# Patient Record
Sex: FEMALE | Race: WHITE | Employment: FULL TIME | URBAN - METROPOLITAN AREA
[De-identification: names, ages, dates, MRNs, and addresses within clinical notes are randomized per-mention and may not be internally consistent; named-entity substitution may affect disease eponyms.]

---

## 2018-01-15 ENCOUNTER — OFFICE VISIT - HEALTHEAST (OUTPATIENT)
Dept: FAMILY MEDICINE | Facility: CLINIC | Age: 48
End: 2018-01-15

## 2018-01-15 DIAGNOSIS — Z00.00 ROUTINE GENERAL MEDICAL EXAMINATION AT A HEALTH CARE FACILITY: ICD-10-CM

## 2018-01-15 DIAGNOSIS — Z12.31 VISIT FOR SCREENING MAMMOGRAM: ICD-10-CM

## 2018-01-15 DIAGNOSIS — L40.9 PSORIASIS: ICD-10-CM

## 2018-01-15 DIAGNOSIS — J45.20 MILD INTERMITTENT ASTHMA WITHOUT COMPLICATION: ICD-10-CM

## 2018-01-15 LAB
ERYTHROCYTE [DISTWIDTH] IN BLOOD BY AUTOMATED COUNT: 11.2 % (ref 11–14.5)
HCT VFR BLD AUTO: 40.2 % (ref 35–47)
HGB BLD-MCNC: 13.3 G/DL (ref 12–16)
MCH RBC QN AUTO: 32 PG (ref 27–34)
MCHC RBC AUTO-ENTMCNC: 33 G/DL (ref 32–36)
MCV RBC AUTO: 97 FL (ref 80–100)
PLATELET # BLD AUTO: 154 THOU/UL (ref 140–440)
PMV BLD AUTO: 9.3 FL (ref 7–10)
RBC # BLD AUTO: 4.14 MILL/UL (ref 3.8–5.4)
TSH SERPL DL<=0.005 MIU/L-ACNC: 3.56 UIU/ML (ref 0.3–5)
WBC: 7 THOU/UL (ref 4–11)

## 2018-01-15 ASSESSMENT — MIFFLIN-ST. JEOR: SCORE: 1161.58

## 2018-01-24 ENCOUNTER — RECORDS - HEALTHEAST (OUTPATIENT)
Dept: ADMINISTRATIVE | Facility: OTHER | Age: 48
End: 2018-01-24

## 2018-02-05 ENCOUNTER — RECORDS - HEALTHEAST (OUTPATIENT)
Dept: MAMMOGRAPHY | Facility: CLINIC | Age: 48
End: 2018-02-05

## 2018-02-05 DIAGNOSIS — Z12.31 ENCOUNTER FOR SCREENING MAMMOGRAM FOR MALIGNANT NEOPLASM OF BREAST: ICD-10-CM

## 2018-03-16 ENCOUNTER — OFFICE VISIT - HEALTHEAST (OUTPATIENT)
Dept: FAMILY MEDICINE | Facility: CLINIC | Age: 48
End: 2018-03-16

## 2018-03-16 DIAGNOSIS — R23.2 HOT FLASHES: ICD-10-CM

## 2018-03-16 DIAGNOSIS — J02.9 SORE THROAT: ICD-10-CM

## 2018-03-16 LAB — DEPRECATED S PYO AG THROAT QL EIA: NORMAL

## 2018-03-16 ASSESSMENT — MIFFLIN-ST. JEOR: SCORE: 1146.15

## 2018-03-17 LAB — GROUP A STREP BY PCR: NORMAL

## 2018-04-06 ENCOUNTER — COMMUNICATION - HEALTHEAST (OUTPATIENT)
Dept: HEALTH INFORMATION MANAGEMENT | Facility: CLINIC | Age: 48
End: 2018-04-06

## 2018-04-06 ENCOUNTER — COMMUNICATION - HEALTHEAST (OUTPATIENT)
Dept: TELEHEALTH | Facility: CLINIC | Age: 48
End: 2018-04-06

## 2019-01-28 ENCOUNTER — OFFICE VISIT - HEALTHEAST (OUTPATIENT)
Dept: FAMILY MEDICINE | Facility: CLINIC | Age: 49
End: 2019-01-28

## 2019-01-28 DIAGNOSIS — Z13.220 LIPID SCREENING: ICD-10-CM

## 2019-01-28 DIAGNOSIS — J45.20 MILD INTERMITTENT ASTHMA WITHOUT COMPLICATION: ICD-10-CM

## 2019-01-28 DIAGNOSIS — L29.9 ITCHY SKIN: ICD-10-CM

## 2019-01-28 DIAGNOSIS — R23.2 HOT FLASHES: ICD-10-CM

## 2019-01-28 DIAGNOSIS — L40.9 PSORIASIS: ICD-10-CM

## 2019-01-28 DIAGNOSIS — Z00.00 ROUTINE GENERAL MEDICAL EXAMINATION AT A HEALTH CARE FACILITY: ICD-10-CM

## 2019-01-28 LAB
ALBUMIN SERPL-MCNC: 4 G/DL (ref 3.5–5)
ALP SERPL-CCNC: 54 U/L (ref 45–120)
ALT SERPL W P-5'-P-CCNC: 22 U/L (ref 0–45)
ANION GAP SERPL CALCULATED.3IONS-SCNC: 10 MMOL/L (ref 5–18)
AST SERPL W P-5'-P-CCNC: 25 U/L (ref 0–40)
BASOPHILS # BLD AUTO: 0 THOU/UL (ref 0–0.2)
BASOPHILS NFR BLD AUTO: 1 % (ref 0–2)
BILIRUB SERPL-MCNC: 0.7 MG/DL (ref 0–1)
BUN SERPL-MCNC: 10 MG/DL (ref 8–22)
CALCIUM SERPL-MCNC: 9.4 MG/DL (ref 8.5–10.5)
CHLORIDE BLD-SCNC: 105 MMOL/L (ref 98–107)
CHOLEST SERPL-MCNC: 201 MG/DL
CO2 SERPL-SCNC: 26 MMOL/L (ref 22–31)
CREAT SERPL-MCNC: 0.85 MG/DL (ref 0.6–1.1)
EOSINOPHIL # BLD AUTO: 0.2 THOU/UL (ref 0–0.4)
EOSINOPHIL NFR BLD AUTO: 5 % (ref 0–6)
ERYTHROCYTE [DISTWIDTH] IN BLOOD BY AUTOMATED COUNT: 10.8 % (ref 11–14.5)
FASTING STATUS PATIENT QL REPORTED: NO
GFR SERPL CREATININE-BSD FRML MDRD: >60 ML/MIN/1.73M2
GLUCOSE BLD-MCNC: 60 MG/DL (ref 70–125)
HCT VFR BLD AUTO: 43.8 % (ref 35–47)
HDLC SERPL-MCNC: 83 MG/DL
HGB BLD-MCNC: 14.5 G/DL (ref 12–16)
LDLC SERPL CALC-MCNC: 107 MG/DL
LYMPHOCYTES # BLD AUTO: 0.9 THOU/UL (ref 0.8–4.4)
LYMPHOCYTES NFR BLD AUTO: 18 % (ref 20–40)
MCH RBC QN AUTO: 31.8 PG (ref 27–34)
MCHC RBC AUTO-ENTMCNC: 33.1 G/DL (ref 32–36)
MCV RBC AUTO: 96 FL (ref 80–100)
MONOCYTES # BLD AUTO: 0.7 THOU/UL (ref 0–0.9)
MONOCYTES NFR BLD AUTO: 14 % (ref 2–10)
NEUTROPHILS # BLD AUTO: 3.1 THOU/UL (ref 2–7.7)
NEUTROPHILS NFR BLD AUTO: 63 % (ref 50–70)
PLATELET # BLD AUTO: 131 THOU/UL (ref 140–440)
PMV BLD AUTO: 9.9 FL (ref 7–10)
POTASSIUM BLD-SCNC: 4.7 MMOL/L (ref 3.5–5)
PROT SERPL-MCNC: 7.3 G/DL (ref 6–8)
RBC # BLD AUTO: 4.57 MILL/UL (ref 3.8–5.4)
SODIUM SERPL-SCNC: 141 MMOL/L (ref 136–145)
TRIGL SERPL-MCNC: 56 MG/DL
TSH SERPL DL<=0.005 MIU/L-ACNC: 4.1 UIU/ML (ref 0.3–5)
WBC: 5 THOU/UL (ref 4–11)

## 2019-01-28 ASSESSMENT — MIFFLIN-ST. JEOR: SCORE: 1172.47

## 2019-03-26 ENCOUNTER — RECORDS - HEALTHEAST (OUTPATIENT)
Dept: MAMMOGRAPHY | Facility: CLINIC | Age: 49
End: 2019-03-26

## 2019-03-26 DIAGNOSIS — Z12.31 ENCOUNTER FOR SCREENING MAMMOGRAM FOR MALIGNANT NEOPLASM OF BREAST: ICD-10-CM

## 2020-04-15 ENCOUNTER — VIRTUAL VISIT (OUTPATIENT)
Dept: PHYSICAL THERAPY | Facility: CLINIC | Age: 50
End: 2020-04-15
Payer: COMMERCIAL

## 2020-04-15 DIAGNOSIS — M79.671 RIGHT FOOT PAIN: Primary | ICD-10-CM

## 2020-04-15 PROCEDURE — 97110 THERAPEUTIC EXERCISES: CPT | Mod: TEL | Performed by: PHYSICAL THERAPIST

## 2020-04-15 PROCEDURE — 97161 PT EVAL LOW COMPLEX 20 MIN: CPT | Mod: TEL | Performed by: PHYSICAL THERAPIST

## 2020-04-16 PROBLEM — M79.671 RIGHT FOOT PAIN: Status: ACTIVE | Noted: 2020-04-16

## 2020-04-16 NOTE — PROGRESS NOTES
"Physical Therapy Virtual Initial Visit      The patient has been notified of following:     \"This virtual visit will be conducted between you and your provider. We have found that certain health care needs can be provided without the need for physical presence.  This service lets us provide the care you need with a virtual visit.\"    Due to external, as well as internal [facility] management of the COVID-19 Virus, Jennifer Barnes was not seen in our clinic.  As a substitution, we implemented a virtual visit to manage this patient's condition utilizing the PTRx virtual visit platform via the patient s existing code.  The provider, Candice Fang, reviewed the patient's chart, PTRx prescription, and spoke with the patient to determine the following telemedicine visit is appropriate and effective for the patient's care.    The following type of visit was completed:   Telephone Visit:  A telephone visit was used in conjunction with the online PTRx exercise platform.         PTRx Content from today's visit:  Exercise Name: Great Toe Extension with Patient Generated OP, Sets: 1 - Reps: 15 hold for 5 sec - Sessions: before and after walking each day  Exercise Name: Standing Soleus Stretch, Sets: 1 - Reps: 15 hold for 5 sec off edge of step - Sessions: before and after walking each day          Virtual visit contact time    Time of service began: 4:00 PM  Time of service ended: 4:40 PM  Total Time for set up, visit, and documentation: 70 minutes    Payor: [employer] / Plan: [employer] OPEN ACCESS / Product Type: HMO /     Procedure Code/s   Therapeutic Exercise (38528): 12 minutes  Evaluation:  28 minutes    I have reviewed the note as documented above.  This accurately captures the substance of my conversation with the patient.  Provider location: [facility] ([facility]/State)  Patient location: [facility] Initial Evaluation -- Lower Extremity    Evaluation Date: April 16, " 2020  Jennifer Barnes is a 49 year old female with a R gr toe/ forefoot condition.   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 MD order 3/2/2020.   Symptoms (improving/unchaning/worsening):  Unchanged .      Symptoms commenced as a result of: walking more and bunion getting bigger   Condition occurred in the following environment: community     Symptoms at onset: heel, fasciae  Paresthesia (yes/no):  no  Spinal history:    Cough/Sneeze (pos/neg):      Constant symptoms:   Intermittent symptoms: yes    Symptoms are worse with the following: Always Walking and Time of day - No effect   Symptoms are better with the following: Other - nothing    Continued use makes the pain (better/worse/no effect): unchanged    Disturbed night (yes/no): no      Pain at rest (yes/no):    Site (back/hip/knee/ankle/foot):      Other questions (swelling/clicking/locking/giving way/falling):  none     Previous episodes: none  Previous treatments:     Specific Questions:  General health (excellent/good/fair/poor):  excellent  Pertinent medical history includes: Asthma  Medications (nil/NSAIDS/analg/steroids/anticoag/other):  None  Medical allergies:  pollen  Imaging (none/Xray/MRI/other):  none  Recent or major surgery (yes/no):  na  Night pain (yes/no):  none  Accidents (yes/no):  no  Unexplained weight loss (yes/no):  na  Barriers at home: none  Other red flags: none    Sites for physical examination (back/hip/knee/ankle/foot/other): foot/  ankle    EXAMINATION    Posture:  Sitting (good/fair/poor):     Correction of Posture (better/worse/no effect/NA):   Standing (good/fair/poor):   Other observations:      Neurological: (NA/motor/sensory/reflexes/dural):     Baselines (pain or functional activity):     Extremities (Hip / Knee / Ankle / Foot): ankle    Movement Loss Ko Mod Min Nil Pain   Flexion        Extension        Abduction        Adduction        Internal Rotation        External Rotation        Dorsiflexion    +    Plantarflexion   +     Inversion    + Pull in arch   Eversion    +      Passive Movement (+/- over pressure)/(PDM/ERP):  G mod loss S min loss gr toe extension mod loss   Resisted Test Response (pain): na  Other Tests: gait: pain occurs with PO from toes    Spine:  Movement loss:   Effect of repeated movements:   Effect of static positioning:   Spine testing (not relevant/relevant/secondary problem):     Baseline Symptoms: 0/10  Repeated Tests Symptom Response Mechanical Response   Active/Passive movement, resisted test, functional test During -  Produce, Abolish, Increase, Decrease, NE After -  Better, Worse, NB, NW, NE Effect -   ? or ? ROM, strength or key functional test No   Effect   Rep ankle DF (gastroc) in standing No Effect    No Effect        Seated rep gr toe extension with self OP  No Effect  Inc ROM  Better  walking                        Effect of static positioning                  Provisional Classification (Extremity/Spine):  Extremity - Derangement  Gr toe    Princicple of Management:   Education:  Gr toe extension loss seems to be the problem    Equipment provided:    Exercise and dosage:  Stand G stretch and seated gr toe extension 1/15 hold for 5 sec before and after walking, daily    ASSESSMENT/PLAN:    Patient is a 49 year old female with right side ankle complaints.    Patient has the following significant findings with corresponding treatment plan.                Diagnosis 1:  Gr toe derangement  Pain -   self management, education, directional preference exercise and home program  Decreased ROM/flexibility - therapeutic exercise, therapeutic activity and home program  Decreased joint mobility - therapeutic exercise, therapeutic activity and home program  Decreased function - therapeutic activities and home program      Previous and current functional limitations:  (See Goal Flow Sheet for this information)    Short term and Long term goals: (See Goal Flow Sheet for this information)     Communication ability:  Patient appears to be able to clearly communicate and understand verbal and written communication and follow directions correctly.  Treatment Explanation - The following has been discussed with the patient:   RX ordered/plan of care  Anticipated outcomes  Possible risks and side effects  This patient would benefit from PT intervention to resume normal activities.   Rehab potential is good.    Frequency:  2 X a month, once daily  Duration:  for 2 months  Discharge Plan:  Achieve all LTG.  Independent in home treatment program.  Reach maximal therapeutic benefit.    Please refer to the daily flowsheet for treatment today, total treatment time and time spent performing 1:1 timed codes.       ___________________________________________________

## 2020-04-29 ENCOUNTER — VIRTUAL VISIT (OUTPATIENT)
Dept: PHYSICAL THERAPY | Facility: CLINIC | Age: 50
End: 2020-04-29
Payer: COMMERCIAL

## 2020-04-29 DIAGNOSIS — M79.671 RIGHT FOOT PAIN: Primary | ICD-10-CM

## 2020-04-29 PROCEDURE — 97530 THERAPEUTIC ACTIVITIES: CPT | Mod: GT | Performed by: PHYSICAL THERAPIST

## 2020-04-29 PROCEDURE — 97110 THERAPEUTIC EXERCISES: CPT | Mod: GT | Performed by: PHYSICAL THERAPIST

## 2020-04-29 NOTE — PROGRESS NOTES
"DISCHARGE REPORT    Physical Therapy Virtual Follow Up Visit      Progress reporting period is from [id number] to [id number]    The patient has been notified of following:     \"This virtual visit will be conducted between you and your provider. We have found that certain health care needs can be provided without the need for physical presence.  This service lets us provide the care you need with a virtual visit.\"    Due to external, as well as internal [facility] management of the COVID-19 Virus, Jennifer Barnes was not seen in our clinic.  As a substitution, we implemented a virtual visit to manage this patient's condition utilizing the PTRx virtual visit platform via the patient s existing code.  The provider, Candice Fang, reviewed the patient's chart, PTRx prescription, and spoke with the patient to determine the following telemedicine visit is appropriate and effective for the patient's care.    The following type of visit was completed:   Video Visit:  The [employer] platform uses a synchronous HIPAA compliant video stream for this patient encounter.      SUBJECTIVE  Subjective changes noted by patient:  Jennifer Barnes is a 49 year old female. Connected virtually on the PTRx platform to discuss their condition/progress. They noted improvements in walking and now able to run 2 miles pain free. Her goal is to run 3 miles 2 x week..        Current pain level is 0/10  .     Previous pain level was  4/10  .   Changes in function:  Yes (See Goal flowsheet attached for changes in current functional level)  Adverse reaction to treatment or activity: None    OBJECTIVE  Changes noted in objective findings:  Yes, AROM: R ankle wnl  Gr toe flex and extension are wnl passively. She gets a moderate gastroc stretch off the edge of step.    PTRx Content from today's visit:  Exercise Name: Toe Raises, Sets: 2 - Reps: 10-12 to start 30 is the goal then do on 1 foot  - Sessions: 3-4 x week  Exercise Name: Ankle Diagonal " Posterior Tibialis, Sets: 2 - Reps: 10-12 slowly - Sessions: 3-4 x week  Exercise Name: Standing Soleus Stretch, Sets: 1 - Reps: 15 hold for 5 sec off edge of step - Sessions: before and after walking each day    ASSESSMENT/PLAN  Updated problem list and treatment plan: Diagnosis 1:  Foot pain  Decreased ROM/flexibility - home program  Decreased strength - home program  STG/LTGs have been met or progress has been made towards goals:  Yes (See Goal flow sheet completed today.)  Assessment of Progress: The patient has met all of their long term goals.  Self Management Plans:  Patient is independent in a home treatment program.  Patient is independent in self management of symptoms.    Jennifer continues to require the following intervention to meet STG and LTG's:  PT intervention is no longer required to meet STG/LTG.    Recommendations:  Patient will continue with the exercise program assigned on their PTRx code.  This patient is ready to be discharged from therapy and continue their home treatment program.      Virtual visit contact time    Time of service began: 1:07PM  Time of service ended: 1:30 PM  Total Time for set up, visit, and documentation: 33 minutes    Payor: [employer] / Plan: [employer] OPEN ACCESS / Product Type: HMO /         I have reviewed the note as documented above.  This accurately captures the substance of my conversation with the patient.  Provider location: [facility] ([facility]/State)  Patient location: home

## 2021-01-04 ENCOUNTER — HEALTH MAINTENANCE LETTER (OUTPATIENT)
Age: 51
End: 2021-01-04

## 2021-02-22 ENCOUNTER — OFFICE VISIT - HEALTHEAST (OUTPATIENT)
Dept: FAMILY MEDICINE | Facility: CLINIC | Age: 51
End: 2021-02-22

## 2021-02-22 DIAGNOSIS — Z00.00 ROUTINE GENERAL MEDICAL EXAMINATION AT A HEALTH CARE FACILITY: ICD-10-CM

## 2021-02-22 DIAGNOSIS — R59.0 SUPRACLAVICULAR LYMPHADENOPATHY: ICD-10-CM

## 2021-02-22 DIAGNOSIS — Z12.31 VISIT FOR SCREENING MAMMOGRAM: ICD-10-CM

## 2021-02-22 DIAGNOSIS — L40.9 PSORIASIS: ICD-10-CM

## 2021-02-22 DIAGNOSIS — Z12.4 SCREENING FOR MALIGNANT NEOPLASM OF CERVIX: ICD-10-CM

## 2021-02-22 DIAGNOSIS — Z13.820 SCREENING FOR OSTEOPOROSIS: ICD-10-CM

## 2021-02-22 DIAGNOSIS — J45.20 MILD INTERMITTENT ASTHMA WITHOUT COMPLICATION: ICD-10-CM

## 2021-02-22 LAB
ALBUMIN SERPL-MCNC: 4.2 G/DL (ref 3.5–5)
ALP SERPL-CCNC: 76 U/L (ref 45–120)
ALT SERPL W P-5'-P-CCNC: 30 U/L (ref 0–45)
ANION GAP SERPL CALCULATED.3IONS-SCNC: 10 MMOL/L (ref 5–18)
AST SERPL W P-5'-P-CCNC: 27 U/L (ref 0–40)
BASOPHILS # BLD AUTO: 0 THOU/UL (ref 0–0.2)
BASOPHILS NFR BLD AUTO: 0 % (ref 0–2)
BILIRUB SERPL-MCNC: 0.7 MG/DL (ref 0–1)
BUN SERPL-MCNC: 14 MG/DL (ref 8–22)
C REACTIVE PROTEIN LHE: 0.2 MG/DL (ref 0–0.8)
CALCIUM SERPL-MCNC: 9 MG/DL (ref 8.5–10.5)
CHLORIDE BLD-SCNC: 105 MMOL/L (ref 98–107)
CO2 SERPL-SCNC: 24 MMOL/L (ref 22–31)
CREAT SERPL-MCNC: 0.85 MG/DL (ref 0.6–1.1)
EOSINOPHIL # BLD AUTO: 0.1 THOU/UL (ref 0–0.4)
EOSINOPHIL NFR BLD AUTO: 2 % (ref 0–6)
ERYTHROCYTE [DISTWIDTH] IN BLOOD BY AUTOMATED COUNT: 11.8 % (ref 11–14.5)
ERYTHROCYTE [SEDIMENTATION RATE] IN BLOOD BY WESTERGREN METHOD: 7 MM/HR (ref 0–20)
GFR SERPL CREATININE-BSD FRML MDRD: >60 ML/MIN/1.73M2
GLUCOSE BLD-MCNC: 69 MG/DL (ref 70–125)
HCT VFR BLD AUTO: 38.9 % (ref 35–47)
HGB BLD-MCNC: 12.9 G/DL (ref 12–16)
IMM GRANULOCYTES # BLD: 0 THOU/UL
IMM GRANULOCYTES NFR BLD: 0 %
LYMPHOCYTES # BLD AUTO: 1.8 THOU/UL (ref 0.8–4.4)
LYMPHOCYTES NFR BLD AUTO: 21 % (ref 20–40)
MCH RBC QN AUTO: 31.2 PG (ref 27–34)
MCHC RBC AUTO-ENTMCNC: 33.2 G/DL (ref 32–36)
MCV RBC AUTO: 94 FL (ref 80–100)
MONOCYTES # BLD AUTO: 0.8 THOU/UL (ref 0–0.9)
MONOCYTES NFR BLD AUTO: 10 % (ref 2–10)
NEUTROPHILS # BLD AUTO: 5.5 THOU/UL (ref 2–7.7)
NEUTROPHILS NFR BLD AUTO: 67 % (ref 50–70)
PLATELET # BLD AUTO: 192 THOU/UL (ref 140–440)
PMV BLD AUTO: 11 FL (ref 7–10)
POTASSIUM BLD-SCNC: 4.4 MMOL/L (ref 3.5–5)
PROT SERPL-MCNC: 7.3 G/DL (ref 6–8)
RBC # BLD AUTO: 4.13 MILL/UL (ref 3.8–5.4)
SODIUM SERPL-SCNC: 139 MMOL/L (ref 136–145)
TSH SERPL DL<=0.005 MIU/L-ACNC: 3.05 UIU/ML (ref 0.3–5)
WBC: 8.3 THOU/UL (ref 4–11)

## 2021-02-22 ASSESSMENT — MIFFLIN-ST. JEOR: SCORE: 1180.29

## 2021-02-23 LAB
25(OH)D3 SERPL-MCNC: 55.6 NG/ML (ref 30–80)
25(OH)D3 SERPL-MCNC: 55.6 NG/ML (ref 30–80)
HPV SOURCE: NORMAL
HUMAN PAPILLOMA VIRUS 16 DNA: NEGATIVE
HUMAN PAPILLOMA VIRUS 18 DNA: NEGATIVE
HUMAN PAPILLOMA VIRUS FINAL DIAGNOSIS: NORMAL
HUMAN PAPILLOMA VIRUS OTHER HR: NEGATIVE
SPECIMEN DESCRIPTION: NORMAL

## 2021-02-25 ENCOUNTER — RECORDS - HEALTHEAST (OUTPATIENT)
Dept: MAMMOGRAPHY | Facility: CLINIC | Age: 51
End: 2021-02-25

## 2021-02-25 DIAGNOSIS — Z12.31 ENCOUNTER FOR SCREENING MAMMOGRAM FOR MALIGNANT NEOPLASM OF BREAST: ICD-10-CM

## 2021-03-01 LAB
BKR LAB AP ABNORMAL BLEEDING: NO
BKR LAB AP BIRTH CONTROL/HORMONES: ABNORMAL
BKR LAB AP CERVICAL APPEARANCE: NORMAL
BKR LAB AP GYN ADEQUACY: ABNORMAL
BKR LAB AP GYN INTERPRETATION: ABNORMAL
BKR LAB AP HPV REFLEX: ABNORMAL
BKR LAB AP LMP: ABNORMAL
BKR LAB AP PATIENT STATUS: ABNORMAL
BKR LAB AP PREVIOUS ABNORMAL: ABNORMAL
BKR LAB AP PREVIOUS NORMAL: ABNORMAL
HIGH RISK?: NO
PATH REPORT.COMMENTS IMP SPEC: ABNORMAL
RESULT FLAG (HE HISTORICAL CONVERSION): ABNORMAL

## 2021-03-04 ENCOUNTER — COMMUNICATION - HEALTHEAST (OUTPATIENT)
Dept: FAMILY MEDICINE | Facility: CLINIC | Age: 51
End: 2021-03-04

## 2021-03-07 ENCOUNTER — HEALTH MAINTENANCE LETTER (OUTPATIENT)
Age: 51
End: 2021-03-07

## 2021-05-28 ASSESSMENT — ASTHMA QUESTIONNAIRES: ACT_TOTALSCORE: 23

## 2021-05-31 VITALS — HEIGHT: 63 IN | BODY MASS INDEX: 22.29 KG/M2 | WEIGHT: 125.8 LBS

## 2021-06-01 VITALS — HEIGHT: 63 IN | WEIGHT: 122.4 LBS | BODY MASS INDEX: 21.69 KG/M2

## 2021-06-02 VITALS — BODY MASS INDEX: 22.59 KG/M2 | WEIGHT: 127.5 LBS | HEIGHT: 63 IN

## 2021-06-05 VITALS
WEIGHT: 130.1 LBS | HEART RATE: 66 BPM | SYSTOLIC BLOOD PRESSURE: 104 MMHG | DIASTOLIC BLOOD PRESSURE: 62 MMHG | HEIGHT: 63 IN | RESPIRATION RATE: 18 BRPM | TEMPERATURE: 97.3 F | BODY MASS INDEX: 23.05 KG/M2 | OXYGEN SATURATION: 99 %

## 2021-06-15 NOTE — PROGRESS NOTES
Assessment/Plan:     1. Routine general medical examination at a health care facility  Routine history and physical, updated in EMR.  Patient defers fasting labs for a future visit.  Other labs updated as below.  Pap smear up-to-date, as are immunizations.  Mammogram ordered.  Plan repeat physical in 1 year.  - HM2(CBC w/o Differential)  - Thyroid Adams    2. Visit for screening mammogram  - Mammo Screening Bilateral; Future    3. Psoriasis  Patient does quite well with application of lotion, avoidance of chemical exposures to the skin.    4. Mild intermittent asthma without complication  Patient uses her albuterol inhaler prior to exercise mostly.  She does not desire a controller medication and her asthma control test score is within goal range.      Subjective:      Jennifer Barnes is a 47 y.o. female who presents for an annual exam. The patient is sexually active. The patient participates in regular exercise: yes. The patient reports that there is not domestic violence in her life.  Patient has been feeling well and has no specific questions or concerns today.  She would like a refill of her albuterol inhaler, which she uses prior to exercise.    Healthy Habits:   Regular Exercise: Yes  Sunscreen Use: Yes  Healthy Diet: Yes  Dental Visits Regularly: Yes  Seat Belt: Yes  Sexually active: Yes  Self Breast Exam Monthly:Yes  Lipid Profile: Yes and normal 9/2016  Glucose Screen: No  Prevention of Osteoporosis: Yes, almond milk daily      Immunization History   Administered Date(s) Administered     DT (pediatric) 07/28/1984     Rubella 07/28/1984     Tdap 04/29/2016     Immunization status: up to date and documented.    No exam data present    Gynecologic History  Patient's last menstrual period was 12/30/2017.  Contraception: vasectomy  Last Pap: 11/2016. Results were: normal and HPV neg  Last mammogram: in [facility] 2012? Results were: normal      OB History   No data available       Current Outpatient Prescriptions  "  Medication Sig Dispense Refill     albuterol (PROVENTIL HFA;VENTOLIN HFA) 90 mcg/actuation inhaler Inhale 2 puffs every 6 (six) hours as needed for wheezing.       No current facility-administered medications for this visit.      No past medical history on file.  No past surgical history on file.  Dust [other environmental allergy]  No family history on file.  Social History     Social History     Marital status:      Spouse name: N/A     Number of children: N/A     Years of education: N/A     Occupational History     Not on file.     Social History Main Topics     Smoking status: Former Smoker     Smokeless tobacco: Never Used     Alcohol use Yes     Drug use: No     Sexual activity: Yes     Partners: Male     Birth control/ protection: None     Other Topics Concern     Not on file     Social History Narrative       Review of Systems  General:  Denies problem  Eyes: Denies problem  Ears/Nose/Throat: Denies problem  Cardiovascular: Denies problem  Respiratory:  Denies problem  Gastrointestinal:  Denies problem  Genitourinary: Denies problem  Musculoskeletal:  Denies problem  Skin: Denies problem  Neurologic: Denies problem  Psychiatric: Denies problem  Endocrine: Denies problem  Heme/Lymphatic: Denies problem   Allergic/Immunologic: Denies problem        Objective:         Vitals:    01/15/18 1412   BP: 104/58   Pulse: 64   Resp: 16   Weight: 125 lb 12.8 oz (57.1 kg)   Height: 5' 2.8\" (1.595 m)     Body mass index is 22.43 kg/(m^2).    Physical Exam:  General Appearance: Alert, cooperative, no distress, appears stated age  Head: Normocephalic, without obvious abnormality, atraumatic  Eyes: PERRL, conjunctiva/corneas clear, EOM's intact  Ears: Normal TM's and external ear canals, both ears  Nose: Nares normal, septum midline, mucosa normal, no drainage  Throat: Lips, mucosa, and tongue normal; teeth and gums normal  Neck: Supple, symmetrical, trachea midline, no adenopathy; thyroid: not enlarged, symmetric, " no tenderness/mass/nodules  Back: Symmetric, no curvature, ROM normal, no CVA tenderness  Lungs: Clear to auscultation bilaterally, respirations unlabored  Breasts: No breast masses, tenderness, asymmetry, or nipple discharge  Heart: Regular rate and rhythm, S1 and S2 normal, no murmur, rub, or gallop  Abdomen: Soft, non-tender, bowel sounds active all four quadrants, no masses, no organomegaly  Pelvic:Not examined  Extremities: Extremities normal, atraumatic, no cyanosis or edema  Skin: Skin color, texture, turgor normal, no rashes or lesions  Lymph nodes: Cervical, supraclavicular, and axillary nodes normal  Neurologic: Normal

## 2021-06-15 NOTE — PROGRESS NOTES
Assessment/Plan:     1. Routine general medical examination at a health care facility  Routine history and physical, updated in EMR.  Labs updated as below.  Routine screening Pap smear updated today.  Immunizations are up-to-date.  Patient agrees to schedule mammogram and this was ordered.  Plan next routine physical in 1 year.    2. Supraclavicular lymphadenopathy  Lab work-up as below is normal/negative.  Ultrasound revealed likely reactive lymph nodes x2.  Radiology recommended continued observation as patient had received her Covid vaccine on that side and this is something that they have seen in the past.  Patient will let me know if lymphadenopathy persists past another 4 to 6 weeks.  At that point, could consider fine-needle aspiration for biopsy.  - Comprehensive Metabolic Panel  - HM1 (CBC and Differential)  - US Neck Limited; Future  - US Neck Limited    3. Psoriasis  Inflammatory markers as below are negative.  Patient has no concerning joint pains, she has been watching diligently for psoriatic arthritis signs or symptoms.  - Sedimentation Rate  - C-Reactive Protein(CRP)    4. Mild intermittent asthma without complication  ACT score is within goal range.  Due for AAP, completed today.  Doing well with albuterol PRN.    5. Screening for osteoporosis  Workup as below completed for secondary causes of low bone mass and workup is unremarkable.  Asked patient to check with her insurance if DXA is covered and we can certainly order this early.  She has never been a regular smoker, has no significant family history of osteoporosis, is not on high risk medications.  - Sedimentation Rate  - C-Reactive Protein(CRP)  - Vitamin D, Total (25-Hydroxy)  - Comprehensive Metabolic Panel  - Thyroid Cascade  - HM1 (CBC and Differential)    6. Screening for malignant neoplasm of cervix  Routine screening pap smear updated today.  - Gynecologic Cytology (PAP Smear)  - HPV High Risk DNA Cervical    7. Visit for screening  "mammogram  Routine screening mammogram ordered today.  - Mammo Screening Bilateral; Future      Subjective:      Jennifer Barnes is a 50 y.o. female who presents for an annual exam. The patient is sexually active. The patient participates in regular exercise: yes. The patient reports that there is not domestic violence in her life.     1. Plantar fasciitis right foot.  During imaging for this, physician wondered about her bone density as it apparently appeared low.  2. Has had COVID vaccines x2.  Now has an \"inflammed\" lymph node in her left neck area for about 4 weeks.    Healthy Habits:   Regular Exercise: Yes  Sunscreen Use: Yes  Healthy Diet: Yes  Dental Visits Regularly: Yes  Seat Belt: Yes  Sexually active: Yes  Self Breast Exam Monthly:Yes  Lipid Profile: No  Glucose Screen: No  Prevention of Osteoporosis: Yes      Immunization History   Administered Date(s) Administered     DT (pediatric) 1984     Influenza, inj, historic,unspecified 2018     Rubella 1984     Tdap 2016     Immunization status: up to date and documented.    No exam data present    Gynecologic History  No LMP recorded.  Contraception: none,  had a vasectomy   Last Pap: 16. Results were: normal  Last mammogram: . Results were: normal      OB History    Para Term  AB Living   2 2 2         SAB TAB Ectopic Multiple Live Births                  # Outcome Date GA Lbr [place]/2nd Weight Sex Delivery Anes PTL Lv   2 Term            1 Term                Current Outpatient Medications   Medication Sig Dispense Refill     albuterol (PROVENTIL HFA;VENTOLIN HFA) 90 mcg/actuation inhaler Inhale 2 puffs every 6 (six) hours as needed for wheezing.       No current facility-administered medications for this visit.      No past medical history on file.  Past Surgical History:   Procedure Laterality Date     MOUTH SURGERY      dental-related and wisdom teeth (x4)     Dust [other environmental " allergy]  Family History   Adopted: Yes   Problem Relation Age of Onset     Breast cancer Mother 61        genetic testing neg     Cancer Maternal Grandmother         HCC     Alcohol abuse Maternal Grandmother      Colon cancer Neg Hx      Diabetes Neg Hx      Heart disease Neg Hx      Social History     Socioeconomic History     Marital status:      Spouse name: Not on file     Number of children: Not on file     Years of education: Not on file     Highest education level: Not on file   Occupational History     Not on file   Social Needs     Financial resource strain: Not on file     Food insecurity     Worry: Not on file     Inability: Not on file     Transportation needs     Medical: Not on file     Non-medical: Not on file   Tobacco Use     Smoking status: Former Smoker     Packs/day: 0.10     Years: 10.00     Pack years: 1.00     Quit date: 04/2017     Years since quitting: 3.8     Smokeless tobacco: Never Used   Substance and Sexual Activity     Alcohol use: Yes     Alcohol/week: 4.0 standard drinks     Types: 4 Cans of beer per week     Drug use: No     Sexual activity: Yes     Partners: Male     Birth control/protection: Surgical     Comment:  Vinicius   Lifestyle     Physical activity     Days per week: Not on file     Minutes per session: Not on file     Stress: Not on file   Relationships     Social connections     Talks on phone: Not on file     Gets together: Not on file     Attends [employer] service: Not on file     Active member of club or organization: Not on file     Attends meetings of clubs or organizations: Not on file     Relationship status: Not on file     Intimate partner violence     Fear of current or ex partner: Not on file     Emotionally abused: Not on file     Physically abused: Not on file     Forced sexual activity: Not on file   Other Topics Concern     Not on file   Social History Narrative     Not on file       Review of Systems  General:  Denies problem  Eyes: Denies  "problem  Ears/Nose/Throat: Denies problem  Cardiovascular: Denies problem  Respiratory:  Denies problem  Gastrointestinal:  Denies problem  Genitourinary: Denies problem  Musculoskeletal:  Denies problem  Skin: Denies problem  Neurologic: Denies problem  Psychiatric: Denies problem  Endocrine: Denies problem  Heme/Lymphatic: enlarged lymph nodes left supraclavicular   Allergic/Immunologic: Denies problem        Objective:         Vitals:    02/22/21 1450   BP: 104/62   Pulse: 66   Resp: 18   Temp: 97.3  F (36.3  C)   TempSrc: Temporal   SpO2: 99%   Weight: 130 lb 1.6 oz (59 kg)   Height: 5' 2.75\" (1.594 m)     Body mass index is 23.23 kg/m .    Physical Exam:  General Appearance: Alert, cooperative, no distress, appears stated age  Head: Normocephalic, without obvious abnormality, atraumatic  Eyes: PERRL, conjunctiva/corneas clear, EOM's intact  Ears: Normal TM's and external ear canals, both ears  Nose: Nares normal, septum midline,mucosa normal, no drainage  Throat: Lips, mucosa, and tongue normal; teeth and gums normal  Neck: Supple, symmetrical, trachea midline, no adenopathy; thyroid: not enlarged, symmetric, no tenderness/mass/nodules; no carotid bruit or JVD  Back: Symmetric, no curvature, ROM normal, no CVA tenderness  Lungs: Clear to auscultation bilaterally, respirations unlabored  Breasts: No breast masses, tenderness, asymmetry, or nipple discharge.  Heart: Regular rate and rhythm, S1 and S2 normal, no murmur, rub, or gallop, Abdomen: Soft, non-tender, bowel sounds active all four quadrants, no masses, no organomegaly  Pelvic: Normally developed genitalia with no external lesions or eruptions. Vagina and cervix show no lesions, inflammation, discharge or tenderness. No cystocele, No rectocele. Uterus normal to palpation.  No adnexal mass or tenderness.  Extremities: Extremities normal, atraumatic, no cyanosis or edema  Skin: Skin color, texture, turgor normal, no rashes or lesions  Lymph nodes: Cervical " and axillary nodes normal, enlarged supraclavicular nodes on the left x2  Neurologic: Normal

## 2021-06-16 NOTE — PROGRESS NOTES
"  Assessment/Plan:      1. Sore throat  Discussed with patient that likely this represents a viral pharyngitis.  Discussed the usual course of symptoms, may progress to rhinosinusitis and ultimately a cough prior to resolution.  Discussed plenty of rest, plenty of fluids, other over-the-counter treatments.  Follow-up as needed if not improving as expected.  - Rapid Strep A Screen-Throat  - Group A Strep, RNA Direct Detection, Throat    2. Hot flashes  No concerning symptoms like appetite change, stool change, or weight loss.  Likely this is related to perimenopause.  Discussed over-the-counter preparations like black cohosh to start with.  She can follow-up as needed.  She states this is not particularly bothersome.        Subjective:       Jennifer Barnes is a 47 y.o. female who presents for evaluation of sore throat.  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  She wanted to make sure that she did not have strep throat.  She started feeling ill yesterday, denied any measured fevers at home, but has had a sore throat since yesterday evening, worse this morning.  She denies cough, does have a mild runny nose.  She has not had any known exposure to strep, but again is a teacher.  Second, patient states that for the past month, she has had \"drenching\" night sweats.  She is unsure when her mother went through menopause.  She also describes hot flashes at night.  Her last menstrual periods were 6 weeks apart, prior to that were normal intervals.  She states she has lost 3 pounds by stopping alcohol, has had no stool changes.    The following portions of the patient's history were reviewed and updated as appropriate: allergies, current medications, past family history, past social history and problem list.      Current Outpatient Prescriptions:      albuterol (PROVENTIL HFA;VENTOLIN HFA) 90 mcg/actuation inhaler, Inhale 2 puffs every 6 (six) hours as needed for wheezing., " "Disp: , Rfl:     Review of Systems   Pertinent items are noted in HPI.      Objective:      /56 (Patient Site: Right Arm, Patient Position: Sitting, Cuff Size: Adult Regular)  Pulse 80  Temp 99  F (37.2  C) (Oral)   Resp 20  Ht 5' 2.8\" (1.595 m)  Wt 122 lb 6.4 oz (55.5 kg)  LMP 03/04/2018  Breastfeeding? No  BMI 21.82 kg/m2    General appearance: alert, appears stated age and cooperative  Head: Normocephalic, without obvious abnormality, atraumatic  Eyes: Conjunctivae clear, sclerae anicteric  Ears: normal TM's and external ear canals both ears  Nose: Nares normal. Septum midline. Mucosa normal. No drainage or sinus tenderness.  Throat: Oral mucosa moist, posterior pharynx mildly erythematous without exudate  Neck: no adenopathy, supple, symmetrical, trachea midline and thyroid not enlarged, symmetric, no tenderness/mass/nodules  Lungs: clear to auscultation bilaterally  Heart: regular rate and rhythm, S1, S2 normal, no murmur, click, rub or gallop        Results for orders placed or performed in visit on 03/16/18   Rapid Strep A Screen-Throat   Result Value Ref Range    Rapid Strep A Antigen No Group A Strep detected, presumptive negative No Group A Strep detected, presumptive negative   Group A Strep, RNA Direct Detection, Throat   Result Value Ref Range    Group A Strep by PCR No Group A Strep rRNA detected No Group A Strep rRNA detected            "

## 2021-06-18 NOTE — LETTER
Letter by Olivia Ascencio MD at      Author: Olivia Ascencio MD Service: -- Author Type: --    Filed:  Encounter Date: 1/28/2019 Status: (Other)         Asthma Action Plan    Patient Name: Jennifer Barnes  Patient YOB: 1970    Doctor's Name: Olivia Ascencio    Emergency Contact:              Severity Classification: Intermittent    What triggers my asthma: not discussed today    GREEN ZONE: Doing Well   No cough, wheeze, chest tightness or shortness of breath during the day or night  Can do your usual activities    Take these medicines before exercise if your asthma is exercise-induced:  Medicine How Much to Take When to take it   albuterol  (also known as ProAir, Ventolin and Proventil) 2 puffs 15-30 minutes prior to exercise or sports     YELLOW ZONE: Asthma is Getting Worse   Cough, wheeze, chest tightness or shortness of breath or  Waking at night due to asthma, or  Can do some, but not all, usual activities.    Keep taking green zone medications and add quick-relief medicine:  Quick Relief Medicine How Much to Take When to take it   albuterol  (also known as ProAir, Ventolin and Proventil) 2 puffs every 4 hours as needed     If you do not feel better and your symptoms do not return to the green zone after one hour of the quick relief medication, then:    Take quick relief treatment again. Call your clinician within 1 hour.    Contact your clinician if you are using quick relief medication more than 2 times per week.    RED ZONE: Medical Alert!   Very short of breath, or  Quick relief medications have not helped, or  Cannot do usual activities, or  Symptoms are same or worse after 24 hours in the Yellow Zone.    Continue green zone medicines and add:  Quick Relief Medicine Dose When to take it   albuterol  (also known as ProAir, Ventolin and Proventil) 2 puffs may repeat every 20 minutes for up to 1 hour     IF ANY OF THESE ARE HAPPENING, SEEK EMERGENCY HELP AND CALL 911!   You  are struggling to breathe and are uncomfortable or  There is simply no clear improvement and you are worried about how to get through the next 30 minutes or  Trouble walking and talking due to shortness of breath, or  Lips or fingernails are blue    Provider signature:  Electronically Signed by Olivia Ascencio   Date: 02/03/19

## 2021-06-21 NOTE — LETTER
Letter by Mago Gaytan RN at      Author: Mago Gaytan RN Service: -- Author Type: --    Filed:  Encounter Date: 3/4/2021 Status: (Other)         Jennifer Barnes  [address] [place] [id number]             March 4, 2021         Dear Ms. Barnes,    This letter is regarding your recent Pap smear and Human Papillomavirus (HPV) test.    Your results showed Atypical Squamous Cells of Undetermined Significance (ASCUS) and HPV negative, meaning that no high-risk HPV was found at this time. ASCUS indicates a mild change only. The vast majority of patients with this result have no significant cervical abnormalities. Your body will usually resolve this mild change on its own over time.    It is recommended that you have your next Pap smear and Human Papillomavirus (HPV) test in 3 years.    If you have additional questions regarding this result, please contact our Registered Nurse, Mago, at [phone].      Sincerely,    Your [facility] Team

## 2021-06-21 NOTE — LETTER
Letter by Olivia Ascencio MD at      Author: Olivia Ascencio MD Service: -- Author Type: --    Filed:  Encounter Date: 2/22/2021 Status: (Other)       My Asthma Action Plan     Name: Jennifer Barnes   YOB: 1970  Date: 2/27/2021   My doctor: Olivia Ascencio MD   My clinic: [facility]        My Rescue Medicine:   Albuterol (Proair/Ventolin/Proventil HFA) 2-4 puffs EVERY 4 HOURS as needed. Use a spacer if recommended by your provider.   My Asthma Severity:   Intermittent/Exercise Induced  Know your asthma triggers: not discussed today             GREEN ZONE   Good Control    I feel good    No cough or wheeze    Can work, sleep and play without asthma symptoms     Take your asthma control medicine every day.     1. If exercise triggers your asthma, take your rescue medication    15 minutes before exercise or sports, and    During exercise if you have asthma symptoms  2. Spacer to use with inhaler: If you have a spacer, make sure to use it with your inhaler             YELLOW ZONE Getting Worse  I have ANY of these:    I do not feel good    Cough or wheeze    Chest feels tight    Wake up at night 1. Keep taking your Green Zone medications  2. Start taking your rescue medicine:    every 20 minutes for up to 1 hour. Then every 4 hours for 24-48 hours.  3. If you stay in the Yellow Zone for more than 12-24 hours, contact your doctor.  4. If you do not return to the Green Zone in 12-24 hours or you get worse, start taking your oral steroid medicine if prescribed by your provider.           RED ZONE Medical Alert - Get Help  I have ANY of these:    I feel awful    Medicine is not helping    Breathing getting harder    Trouble walking or talking    Nose opens wide to breathe     1. Take your rescue medicine NOW  2. If your provider has prescribed an oral steroid medicine, start taking it NOW  3. Call your doctor NOW  4. If you are still in the Red Zone after 20 minutes  and you have not reached your doctor:    Take your rescue medicine again and    Call 911 or go to the emergency room right away    See your regular doctor within 2 weeks of an Emergency Room or Urgent Care visit for follow-up treatment.          Annual Reminders:  Meet with Asthma Educator,  Flu Shot in the Fall, consider Pneumonia Vaccination for patients with asthma (aged 19 and older).    Pharmacy:   [facility] [id number] [place] [address] [address]  Phone: [phone] Fax: [phone]      Electronically signed by Olivia Ascencio MD   Date: 02/27/21                      Asthma Triggers  How To Control Things That Make Your Asthma Worse    Triggers are things that make your asthma worse.  Look at the list below to help you find your triggers and what you can do about them.  You can help prevent asthma flare-ups by staying away from your triggers.      Trigger                                                          What you can do   Cigarette Smoke  Tobacco smoke can make asthma worse. Do not allow smoking in your home, car or around you.  Be sure no one smokes at a binta day care or school.  If you smoke, ask your health care provider for ways to help you quit.  Ask family members to quit too.  Ask your health care provider for a referral to Quit Plan to help you quit smoking, or call 4-109-116-PLAN.     Colds, Flu, Bronchitis  These are common triggers of asthma. Wash your hands often.  Dont touch your eyes, nose or mouth.  Get a flu shot every year.     Dust Mites  These are tiny bugs that live in cloth or carpet. They are too small to see. Wash sheets and blankets in hot water every week.   Encase pillows and mattress in dust mite proof covers.  Avoid having carpet if you can. If you have carpet, vacuum weekly.   Use a dust mask and HEPA vacuum.   Pollen and Outdoor Mold  Some people are allergic to trees, grass, or weed pollen, or molds. Try to keep  your windows closed.  Limit time out doors when pollen count is high.   Ask you health care provider about taking medicine during allergy season.     Animal Dander  Some people are allergic to skin flakes, urine or saliva from pets with fur or feathers. Keep pets with fur or feathers out of your home.    If you cant keep the pet outdoors, then keep the pet out of your bedroom.  Keep the bedroom door closed.  Keep pets off cloth furniture and away from stuffed toys.     Mice, Rats, and Cockroaches  Some people are allergic to the waste from these pests.   Cover food and garbage.  Clean up spills and food crumbs.  Store grease in the refrigerator.   Keep food out of the bedroom.   Indoor Mold  This can be a trigger if your home has high moisture. Fix leaking faucets, pipes, or other sources of water.   Clean moldy surfaces.  Dehumidify basement if it is damp and smelly.   Smoke, Strong Odors, and Sprays  These can reduce air quality. Stay away from strong odors and sprays, such as perfume, powder, hair spray, paints, smoke incense, paint, cleaning products, candles and new carpet.   Exercise or Sports  Some people with asthma have this trigger. Be active!  Ask your doctor about taking medicine before sports or exercise to prevent symptoms.    Warm up for 5-10 minutes before and after sports or exercise.     Other Triggers of Asthma  Cold air:  Cover your nose and mouth with a scarf.  Sometimes laughing or crying can be a trigger.  Some medicines and food can trigger asthma.

## 2021-06-23 NOTE — PROGRESS NOTES
Assessment/Plan:     1. Routine general medical examination at a health care facility  Routine history and physical, updated in EMR.  Non-fasting labs updated as below.  Pap smear and mammogram are up to date, as are immunizations.  Plan repeat physical in 1 year.    2. Hot flashes  Possibly perimenopausal, but unclear why these only happen during 2 months of the winter.  She continues to have regular menses also.  Discussed black cohosh and Estroven that she may try initially.  Follow up if desires a trial of something like Effexor.  Lab workup as below is normal.  - Comprehensive Metabolic Panel  - HM1(CBC and Differential)  - Thyroid Cascade  - HM1 (CBC with Diff)    3. Psoriasis  Patient controls this with a natural medicine and moisturizer.  Emphasized liberal use of emollient lotions, offered topical steroid, patient declines.    4. Mild intermittent asthma without complication  ACT is well-controlled with albuterol only.    5. Itchy skin  Patient had some worry about cholestasis, which she had with a previous pregnancy.  Labs in this regard are normal, bile acids not checked.  She can certainly follow up if this worsens despite good skin care.  - Comprehensive Metabolic Panel    6. Lipid screening  Screening lipid profile updated today.  - Lipid Cascade RANDOM      Subjective:      Jennifer Barnes is a 48 y.o. female who presents for an annual exam. The patient is sexually active. The patient participates in regular exercise: yes. The patient reports that there is not domestic violence in her life.     1. Hot flashes during 2 months of the winter only.  Happening for the last 2-3 years.  Wakes up in a sweat.  Still having very regular periods.  2. Psoriasis worse on legs, behind knees.  Improves with use of a natural cream that she got from a friend.  Prefers not to use topical steroids as she has not found these particularly helpful.  3. Skin can be itchy everywhere.  Feels similar to when she had  cholestasis with a previous pregnancy.  No abdominal pain, no jaundice.    Healthy Habits:   Regular Exercise: Yes  Sunscreen Use: Yes  Healthy Diet: Yes  Dental Visits Regularly: Yes  Seat Belt: Yes  Sexually active: Yes  Self Breast Exam Monthly:Yes  Lipid Profile: No  Glucose Screen: No  Prevention of Osteoporosis: No      Immunization History   Administered Date(s) Administered     DT (pediatric) 1984     Influenza, inj, historic,unspecified 2018     Rubella 1984     Tdap 2016     Immunization status: up to date and documented.    No exam data present    Gynecologic History  Patient's last menstrual period was 2019.  Contraception: vasectomy  Last Pap: 16. Results were: normal and HPV neg  Last mammogram: 18. Results were: normal      OB History    Para Term  AB Living   2 2 2         SAB TAB Ectopic Multiple Live Births                  # Outcome Date GA Lbr [place]/2nd Weight Sex Delivery Anes PTL Lv   2 Term            1 Term                   Current Outpatient Medications   Medication Sig Dispense Refill     albuterol (PROVENTIL HFA;VENTOLIN HFA) 90 mcg/actuation inhaler Inhale 2 puffs every 6 (six) hours as needed for wheezing.       No current facility-administered medications for this visit.      No past medical history on file.  Past Surgical History:   Procedure Laterality Date     MOUTH SURGERY      dental-related and wisdom teeth (x4)     Dust [other environmental allergy]  Family History   Adopted: Yes   Problem Relation Age of Onset     Breast cancer Mother         genetic testing neg     Cancer Maternal Grandmother         HCC     Alcohol abuse Maternal Grandmother      Colon cancer Neg Hx      Diabetes Neg Hx      Heart disease Neg Hx      Social History     Socioeconomic History     Marital status:      Spouse name: Not on file     Number of children: Not on file     Years of education: Not on file     Highest education level: Not on file  "  Social Needs     Financial resource strain: Not on file     Food insecurity - worry: Not on file     Food insecurity - inability: Not on file     Transportation needs - medical: Not on file     Transportation needs - non-medical: Not on file   Occupational History     Not on file   Tobacco Use     Smoking status: Former Smoker     Packs/day: 0.10     Years: 10.00     Pack years: 1.00     Last attempt to quit: 2017     Years since quittin.8     Smokeless tobacco: Never Used   Substance and Sexual Activity     Alcohol use: Yes     Alcohol/week: 0.0 - 3.0 oz     Drug use: No     Sexual activity: Yes     Partners: Male     Birth control/protection: Surgical     Comment:  Vinicius   Other Topics Concern     Not on file   Social History Narrative     Not on file       Review of Systems  General:  Denies problem  Eyes: Denies problem  Ears/Nose/Throat: Denies problem  Cardiovascular: Denies problem  Respiratory:  Denies problem  Gastrointestinal:  Denies problem  Genitourinary: Denies problem  Musculoskeletal:  Denies problem  Skin: psoriasis, itchy skin  Neurologic: Denies problem  Psychiatric: Denies problem  Endocrine: hot flashes, night sweats  Heme/Lymphatic: Denies problem   Allergic/Immunologic: Denies problem        Objective:         Vitals:    19 1002   BP: 100/62   Pulse: 84   Resp: 16   Weight: 127 lb 8 oz (57.8 kg)   Height: 5' 3\" (1.6 m)     Body mass index is 22.59 kg/m .    Physical Exam:  General Appearance: Alert, cooperative, no distress, appears stated age  Head: Normocephalic, without obvious abnormality, atraumatic  Eyes: PERRL, conjunctiva/corneas clear, EOM's intact  Ears: Normal TM's and external ear canals, both ears  Nose: Nares normal, septum midline, mucosa normal, no drainage  Throat: Lips, mucosa, and tongue normal; teeth and gums normal  Neck: Supple, symmetrical, trachea midline, no adenopathy;  thyroid: not enlarged, symmetric, no tenderness/mass/nodules  Back: Symmetric, " no curvature, ROM normal, no CVA tenderness  Lungs: Clear to auscultation bilaterally, respirations unlabored  Breasts: No breast masses, tenderness, asymmetry, or nipple discharge  Heart: Regular rate and rhythm, S1 and S2 normal, no murmur, rub, or gallop  Abdomen: Soft, non-tender, bowel sounds active all four quadrants, no masses, no organomegaly  Pelvic:Not examined  Extremities: Extremities normal, atraumatic, no cyanosis or edema  Skin: Skin color, texture, turgor normal, no rashes or lesions  Lymph nodes: Cervical, supraclavicular, and axillary nodes normal  Neurologic: Normal

## 2021-10-10 ENCOUNTER — HEALTH MAINTENANCE LETTER (OUTPATIENT)
Age: 51
End: 2021-10-10

## 2022-01-25 ENCOUNTER — OFFICE VISIT (OUTPATIENT)
Dept: FAMILY MEDICINE | Facility: CLINIC | Age: 52
End: 2022-01-25
Payer: COMMERCIAL

## 2022-01-25 VITALS
BODY MASS INDEX: 22.32 KG/M2 | WEIGHT: 126 LBS | RESPIRATION RATE: 16 BRPM | HEIGHT: 63 IN | SYSTOLIC BLOOD PRESSURE: 117 MMHG | HEART RATE: 54 BPM | DIASTOLIC BLOOD PRESSURE: 56 MMHG

## 2022-01-25 DIAGNOSIS — Z12.11 SCREENING FOR COLON CANCER: ICD-10-CM

## 2022-01-25 DIAGNOSIS — L29.9 ITCHY SKIN: ICD-10-CM

## 2022-01-25 DIAGNOSIS — R14.3 FLATULENCE, ERUCTATION AND GAS PAIN: ICD-10-CM

## 2022-01-25 DIAGNOSIS — J45.20 MILD INTERMITTENT ASTHMA WITHOUT COMPLICATION: ICD-10-CM

## 2022-01-25 DIAGNOSIS — R14.1 FLATULENCE, ERUCTATION AND GAS PAIN: ICD-10-CM

## 2022-01-25 DIAGNOSIS — Z13.220 SCREENING FOR LIPID DISORDERS: ICD-10-CM

## 2022-01-25 DIAGNOSIS — Z00.00 ROUTINE GENERAL MEDICAL EXAMINATION AT A HEALTH CARE FACILITY: Primary | ICD-10-CM

## 2022-01-25 DIAGNOSIS — L40.9 PSORIASIS: ICD-10-CM

## 2022-01-25 DIAGNOSIS — R14.2 FLATULENCE, ERUCTATION AND GAS PAIN: ICD-10-CM

## 2022-01-25 DIAGNOSIS — M25.552 HIP PAIN, LEFT: ICD-10-CM

## 2022-01-25 DIAGNOSIS — L65.9 HAIR LOSS: ICD-10-CM

## 2022-01-25 DIAGNOSIS — L20.89 FLEXURAL ATOPIC DERMATITIS: ICD-10-CM

## 2022-01-25 PROBLEM — R23.2 HOT FLASHES: Status: ACTIVE | Noted: 2018-03-19

## 2022-01-25 LAB
ALBUMIN SERPL-MCNC: 4 G/DL (ref 3.5–5)
ALP SERPL-CCNC: 79 U/L (ref 45–120)
ALT SERPL W P-5'-P-CCNC: 38 U/L (ref 0–45)
ANION GAP SERPL CALCULATED.3IONS-SCNC: 10 MMOL/L (ref 5–18)
AST SERPL W P-5'-P-CCNC: 38 U/L (ref 0–40)
BILIRUB SERPL-MCNC: 0.7 MG/DL (ref 0–1)
BUN SERPL-MCNC: 15 MG/DL (ref 8–22)
CALCIUM SERPL-MCNC: 9.5 MG/DL (ref 8.5–10.5)
CHLORIDE BLD-SCNC: 106 MMOL/L (ref 98–107)
CHOLEST SERPL-MCNC: 208 MG/DL
CO2 SERPL-SCNC: 22 MMOL/L (ref 22–31)
CREAT SERPL-MCNC: 0.78 MG/DL (ref 0.6–1.1)
ERYTHROCYTE [DISTWIDTH] IN BLOOD BY AUTOMATED COUNT: 11.8 % (ref 10–15)
FASTING STATUS PATIENT QL REPORTED: YES
GFR SERPL CREATININE-BSD FRML MDRD: >90 ML/MIN/1.73M2
GLUCOSE BLD-MCNC: 70 MG/DL (ref 70–125)
HCT VFR BLD AUTO: 42.6 % (ref 35–47)
HDLC SERPL-MCNC: 81 MG/DL
HGB BLD-MCNC: 13.9 G/DL (ref 11.7–15.7)
LDLC SERPL CALC-MCNC: 119 MG/DL
MCH RBC QN AUTO: 31.4 PG (ref 26.5–33)
MCHC RBC AUTO-ENTMCNC: 32.6 G/DL (ref 31.5–36.5)
MCV RBC AUTO: 96 FL (ref 78–100)
PLATELET # BLD AUTO: 181 10E3/UL (ref 150–450)
POTASSIUM BLD-SCNC: 4 MMOL/L (ref 3.5–5)
PROT SERPL-MCNC: 7.1 G/DL (ref 6–8)
RBC # BLD AUTO: 4.42 10E6/UL (ref 3.8–5.2)
SODIUM SERPL-SCNC: 138 MMOL/L (ref 136–145)
TRIGL SERPL-MCNC: 41 MG/DL
TSH SERPL DL<=0.005 MIU/L-ACNC: 4.29 UIU/ML (ref 0.3–5)
WBC # BLD AUTO: 4.7 10E3/UL (ref 4–11)

## 2022-01-25 PROCEDURE — 80061 LIPID PANEL: CPT | Performed by: FAMILY MEDICINE

## 2022-01-25 PROCEDURE — 99396 PREV VISIT EST AGE 40-64: CPT | Performed by: FAMILY MEDICINE

## 2022-01-25 PROCEDURE — 99214 OFFICE O/P EST MOD 30 MIN: CPT | Mod: 25 | Performed by: FAMILY MEDICINE

## 2022-01-25 PROCEDURE — 85027 COMPLETE CBC AUTOMATED: CPT | Performed by: FAMILY MEDICINE

## 2022-01-25 PROCEDURE — 36415 COLL VENOUS BLD VENIPUNCTURE: CPT | Performed by: FAMILY MEDICINE

## 2022-01-25 PROCEDURE — 80053 COMPREHEN METABOLIC PANEL: CPT | Performed by: FAMILY MEDICINE

## 2022-01-25 PROCEDURE — 84443 ASSAY THYROID STIM HORMONE: CPT | Performed by: FAMILY MEDICINE

## 2022-01-25 RX ORDER — FLURANDRENOLIDE 0.5 MG/ML
LOTION TOPICAL
COMMUNITY
Start: 2019-01-01

## 2022-01-25 RX ORDER — CLOBETASOL PROPIONATE 0.05 MG/G
GEL TOPICAL
COMMUNITY
Start: 2019-01-01

## 2022-01-25 RX ORDER — LORATADINE 10 MG/1
10 TABLET ORAL PRN
COMMUNITY

## 2022-01-25 ASSESSMENT — ASTHMA QUESTIONNAIRES
QUESTION_5 LAST FOUR WEEKS HOW WOULD YOU RATE YOUR ASTHMA CONTROL: COMPLETELY CONTROLLED
QUESTION_1 LAST FOUR WEEKS HOW MUCH OF THE TIME DID YOUR ASTHMA KEEP YOU FROM GETTING AS MUCH DONE AT WORK, SCHOOL OR AT HOME: NONE OF THE TIME
ACT_TOTALSCORE: 25
QUESTION_4 LAST FOUR WEEKS HOW OFTEN HAVE YOU USED YOUR RESCUE INHALER OR NEBULIZER MEDICATION (SUCH AS ALBUTEROL): NOT AT ALL
QUESTION_2 LAST FOUR WEEKS HOW OFTEN HAVE YOU HAD SHORTNESS OF BREATH: NOT AT ALL
QUESTION_3 LAST FOUR WEEKS HOW OFTEN DID YOUR ASTHMA SYMPTOMS (WHEEZING, COUGHING, SHORTNESS OF BREATH, CHEST TIGHTNESS OR PAIN) WAKE YOU UP AT NIGHT OR EARLIER THAN USUAL IN THE MORNING: NOT AT ALL
ACT_TOTALSCORE: 25

## 2022-01-25 ASSESSMENT — MIFFLIN-ST. JEOR: SCORE: 1156.69

## 2022-01-25 NOTE — PROGRESS NOTES
"   SUBJECTIVE:   CC: Jennifer Barnes is an 51 year old woman who presents for preventive health visit.     Patient has been advised of split billing requirements and indicates understanding: Yes     Healthy Habits:     Getting at least 3 servings of Calcium per day:  Yes    Bi-annual eye exam:  NO    Dental care twice a year:  NO    Sleep apnea or symptoms of sleep apnea:  None    Diet:  Other    Frequency of exercise:  4-5 days/week    Duration of exercise:  30-45 minutes    Taking medications regularly:  Yes    Medication side effects:  Not applicable    PHQ-2 Total Score: 0    Additional concerns today:  No      PROBLEMS TO ADD ON...  1. Rash on back of left knee.  Does not feel the same as patch of psoriasis.  Seems to worsen with exercise or sweating, possibly sleep position.  Responds nicely to topical steroids, resolves within 1 day, also not consistent with previous psoriasis.  Never flaky.  2. Happened a few years ago as well, but having pain in the anterior hip area.  Worse after sitting for a prolonged period.  Sometimes if she gets up too fast, feels a \"catch\" phenomenon and has to move a certain way to get things to move again.  Previously resolved with some stretching exercises given by a friend who teaches [employer].  Wondering about hip flexor tendinopathy.  No discrete injury.  Has tried some chiropractic as well.  Interested in a trial of physical therapy.  3. Over the past month feels \"prickly\" skin at night.  Uses lotion very consistently.  No new products.  No specific area, more diffuse.  No noticeable rash.  4. Gas.  Feels that she has quite a bit of flatulence.  She eats a very vegetable rich diet, does not eat a lot of dairy.  No specific association with foods has been found apart from possibly legumes.  Has never tried Beano.  No other stool changes like loose stools or constipation.  No bloody stools.  5. Had some hair thinning when she was doing Noom for weight loss.  Got down to 118 " pounds at some point and felt really good apart from hair loss.  Wondering if this could be hormonal.    Today's PHQ-2 Score:   PHQ-2 (  [employer]) 2022   Q1: Little interest or pleasure in doing things 0   Q2: Feeling down, depressed or hopeless 0   PHQ-2 Score 0   Q1: Little interest or pleasure in doing things Not at all   Q2: Feeling down, depressed or hopeless Not at all   PHQ-2 Score 0       Abuse: Current or Past (Physical, Sexual or Emotional) - No  Do you feel safe in your environment? Yes    Have you ever done Advance Care Planning? (For example, a Health Directive, POLST, or a discussion with a medical provider or your loved ones about your wishes): No, advance care planning information given to patient to review.  Patient plans to discuss their wishes with loved ones or provider.      Social History     Tobacco Use     Smoking status: Former Smoker     Packs/day: 0.10     Years: 10.00     Pack years: 1.00     Quit date: 2017     Years since quittin.8     Smokeless tobacco: Never Used   Substance Use Topics     Alcohol use: Yes     Alcohol/week: 4.0 standard drinks         Alcohol Use 2022   Prescreen: >3 drinks/day or >7 drinks/week? No       Reviewed orders with patient.  Reviewed health maintenance and updated orders accordingly - Yes    BP Readings from Last 3 Encounters:   22 117/56   21 104/62    Wt Readings from Last 3 Encounters:   22 57.2 kg (126 lb)   21 59 kg (130 lb 1.6 oz)   19 57.8 kg (127 lb 8 oz)                  Patient Active Problem List   Diagnosis     Psoriasis     Mild intermittent asthma without complication     Hot flashes     ASCUS of cervix with negative high risk HPV     Allergic rhinitis due to pollen     Hip pain, left     Itchy skin     Hair loss     Flexural atopic dermatitis     Flatulence, eructation and gas pain     Past Surgical History:   Procedure Laterality Date     MOUTH SURGERY      dental-related and wisdom teeth  (x4)       Social History     Tobacco Use     Smoking status: Former Smoker     Packs/day: 0.10     Years: 10.00     Pack years: 1.00     Quit date: 2017     Years since quittin.8     Smokeless tobacco: Never Used   Substance Use Topics     Alcohol use: Yes     Alcohol/week: 4.0 standard drinks     Comment: 7/wk     Family History   Adopted: Yes   Problem Relation Age of Onset     Breast Cancer Mother 61.00        genetic testing neg     Thyroid Disease Mother      Cancer Maternal Grandmother         HCC     Alcoholism Maternal Grandmother      Colon Cancer No family hx of      Diabetes No family hx of      Heart Disease No family hx of          Current Outpatient Medications   Medication Sig Dispense Refill     albuterol (PROVENTIL HFA;VENTOLIN HFA) 90 mcg/actuation inhaler [ALBUTEROL (PROVENTIL HFA;VENTOLIN HFA) 90 MCG/ACTUATION INHALER] Inhale 2 puffs every 6 (six) hours as needed for wheezing.       clobetasol (TEMOVATE) 0.05 % GEL topical gel        flurandrenolide (CORDRAN) 0.05 % external lotion        loratadine (CLARITIN) 10 MG tablet Take 10 mg by mouth as needed       Allergies   Allergen Reactions     Other Environmental Allergy Other (See Comments)     Red eyes and sneezing., DUST       Breast Cancer Screening:  FHS-7:   Breast CA Risk Assessment (FHS-7) 2022   Did any of your first-degree relatives have breast or ovarian cancer? Yes   Did any of your relatives have bilateral breast cancer? No   Did any man in your family have breast cancer? No   Did any woman in your family have breast and ovarian cancer? No   Did any woman in your family have breast cancer before age 50 y? No   Do you have 2 or more relatives with breast and/or ovarian cancer? No   Do you have 2 or more relatives with breast and/or bowel cancer? No       Mammogram Screening: Recommended annual mammography  Pertinent mammograms are reviewed under the imaging tab.    History of abnormal Pap smear: YES - updated in Problem  "List and Health Maintenance accordingly  PAP / HPV Latest Ref Rng & Units 2/22/2021   PAP Negative for squamous intraepithelial lesion or malignancy. Atypical squamous cells of undetermined significance  Electronically signed by Pj Carr MD on 3/1/2021 at 10:16 AM  (A)   HPV16 NEG Negative   HPV18 NEG Negative   HRHPV NEG Negative     Reviewed and updated as needed this visit by clinical staff  Tobacco  Allergies  Meds             Reviewed and updated as needed this visit by Provider  Tobacco  Allergies  Meds  Problems  Med Hx  Surg Hx  Fam Hx  Soc Hx          Review of Systems  CONSTITUTIONAL: NEGATIVE for fever, chills  INTEGUMENTARU/SKIN: NEGATIVE for worrisome rashes, moles or lesions; itchy skin and patch behind knee as above  EYES: NEGATIVE for vision changes or irritation  ENT: NEGATIVE for ear, mouth and throat problems  RESP: NEGATIVE for significant cough or SOB  BREAST: NEGATIVE for masses, tenderness or discharge  CV: NEGATIVE for chest pain, palpitations or peripheral edema  GI: NEGATIVE for nausea, abdominal pain, heartburn, or change in bowel habits; gas as above  : NEGATIVE for unusual urinary or vaginal symptoms. Periods are regular.  MUSCULOSKELETAL: NEGATIVE for significant arthralgias or myalgia  NEURO: NEGATIVE for weakness, dizziness or paresthesias  PSYCHIATRIC: NEGATIVE for changes in mood or affect     OBJECTIVE:   /56 (BP Location: Left arm, Patient Position: Sitting, Cuff Size: Adult Regular)   Pulse 54   Resp 16   Ht 1.594 m (5' 2.75\")   Wt 57.2 kg (126 lb)   LMP 12/21/2021   Breastfeeding No   BMI 22.50 kg/m    Physical Exam  GENERAL: healthy, alert and no distress  EYES: Eyes grossly normal to inspection, PERRL and conjunctivae and sclerae normal  HENT: ear canals and TM's normal, nose and mouth without ulcers or lesions  NECK: no adenopathy, no asymmetry, masses, or scars and thyroid normal to palpation  RESP: lungs clear to auscultation - no rales, " rhonchi or wheezes  BREAST: normal without masses, tenderness or nipple discharge and no palpable axillary masses or adenopathy  CV: regular rate and rhythm, normal S1 S2, no S3 or S4, no murmur, click or rub, no peripheral edema and peripheral pulses strong  ABDOMEN: soft, nontender, no hepatosplenomegaly, no masses and bowel sounds normal  MS: no gross musculoskeletal defects noted, no edema  SKIN: no suspicious lesions or rashes  NEURO: Normal strength and tone, mentation intact and speech normal  PSYCH: mentation appears normal, affect normal/bright    Diagnostic Test Results:  Labs reviewed in Epic  Results for orders placed or performed in visit on 01/25/22   CBC with platelets     Status: Normal   Result Value Ref Range    WBC Count 4.7 4.0 - 11.0 10e3/uL    RBC Count 4.42 3.80 - 5.20 10e6/uL    Hemoglobin 13.9 11.7 - 15.7 g/dL    Hematocrit 42.6 35.0 - 47.0 %    MCV 96 78 - 100 fL    MCH 31.4 26.5 - 33.0 pg    MCHC 32.6 31.5 - 36.5 g/dL    RDW 11.8 10.0 - 15.0 %    Platelet Count 181 150 - 450 10e3/uL         ASSESSMENT/PLAN:   1. Routine general medical examination at a health care facility  Routine history and physical, updated in EMR.  Labs updated as below.  Immunizations are up-to-date with the exception of PPSV23, patient defers for now as her asthma is quite mild and only exercise-induced.  She received a reminder card and will schedule her mammogram next month.  Pap smear is up-to-date, due in 2024.  Discussed options for colon cancer screening and patient wishes to use Cologuard, this was ordered today.  Plan repeat physical in 1 year.  - Lipid panel reflex to direct LDL Fasting; Future  - TSH with free T4 reflex; Future  - Comprehensive metabolic panel (BMP + Alb, Alk Phos, ALT, AST, Total. Bili, TP); Future  - CBC with platelets; Future    2. Hip pain, left  Differential includes degenerative change versus hip flexor tendinopathy versus labral issue given the catch phenomenon.  Recommended x-ray  today, this is pending.  PT referral placed pending x-ray results.  If no improvement with PT, consider more detailed imaging with MRI.  Follow-up as needed.  - Physical Therapy Referral; Future  - XR Hip Left 2-3 Views; Future    3. Itchy skin  Unclear etiology, no rash.  Patient did have some cholestasis of pregnancy with her first pregnancy.  Will check a comprehensive metabolic panel to rule out ongoing cholestasis.  Did not order bile acids today.  Consider visit with dermatology if not improving.  - Comprehensive metabolic panel (BMP + Alb, Alk Phos, ALT, AST, Total. Bili, TP); Future    4. Psoriasis  Patient does not follow with dermatology.  She believes this has been stable and under good control.  - TSH with free T4 reflex; Future  - CBC with platelets; Future    5. Mild intermittent asthma without complication  Doing well using albuterol prior to exercise.    6. Hair loss  Likely related to weight loss, however labs will be updated as below.  - TSH with free T4 reflex; Future  - Comprehensive metabolic panel (BMP + Alb, Alk Phos, ALT, AST, Total. Bili, TP); Future  - CBC with platelets; Future    7. Flexural atopic dermatitis  Responds nicely to topical steroid.  Discussed with patient that as long as this does not spread, no further work-up is needed.  - TSH with free T4 reflex; Future    8. Flatulence, eructation and gas pain  Discussed options including trying Beano prior to gas producing vegetable intake.  She will try this first.  History is not consistent with dairy intolerance or gluten allergy.  Could consider a visit with GI if this becomes more bothersome and is unresponsive to Beano.  - Comprehensive metabolic panel (BMP + Alb, Alk Phos, ALT, AST, Total. Bili, TP); Future    9. Screening for lipid disorders  Lipid profile updated today.  Patient had some almond milk in her coffee this morning.  Otherwise is fasting.  - Lipid panel reflex to direct LDL Fasting; Future    10. Screening for colon  "cancer  After discussion of available screening options, patient wishes to try Cologuard.  - COLOGUARD([employer])      Patient has been advised of split billing requirements and indicates understanding: Yes    COUNSELING:  Reviewed preventive health counseling, as reflected in patient instructions  Special attention given to:        Regular exercise       Healthy diet/nutrition    Estimated body mass index is 22.5 kg/m  as calculated from the following:    Height as of this encounter: 1.594 m (5' 2.75\").    Weight as of this encounter: 57.2 kg (126 lb).        She reports that she quit smoking about 4 years ago. She has a 1.00 pack-year smoking history. She has never used smokeless tobacco.      Counseling Resources:  ATP IV Guidelines  Pooled Cohorts Equation Calculator  Breast Cancer Risk Calculator  BRCA-Related Cancer Risk Assessment: FHS-7 Tool  FRAX Risk Assessment  ICSI Preventive Guidelines  Dietary Guidelines for Americans, 2010  USDA's MyPlate  ASA Prophylaxis  Lung CA Screening    Olivia Ascencio MD  [facility]  "

## 2022-01-25 NOTE — LETTER
My Asthma Action Plan    Name: Jennifer Barnes   YOB: 1970  Date: 1/25/2022   My doctor: Olivia Ascencio MD   My clinic: [facility]        My Rescue Medicine:   Albuterol inhaler (Proair/Ventolin/Proventil HFA)  2-4 puffs EVERY 4 HOURS as needed. Use a spacer if recommended by your provider.   My Asthma Severity:   Intermittent / Exercise Induced  Know your asthma triggers: exercise or sports             GREEN ZONE   Good Control    I feel good    No cough or wheeze    Can work, sleep and play without asthma symptoms       Take your asthma control medicine every day.     1. If exercise triggers your asthma, take your rescue medication    15 minutes before exercise or sports, and    During exercise if you have asthma symptoms  2. Spacer to use with inhaler: If you have a spacer, make sure to use it with your inhaler             YELLOW ZONE Getting Worse  I have ANY of these:    I do not feel good    Cough or wheeze    Chest feels tight    Wake up at night   1. Keep taking your Green Zone medications  2. Start taking your rescue medicine:    every 20 minutes for up to 1 hour. Then every 4 hours for 24-48 hours.  3. If you stay in the Yellow Zone for more than 12-24 hours, contact your doctor.  4. If you do not return to the Green Zone in 12-24 hours or you get worse, start taking your oral steroid medicine if prescribed by your provider.           RED ZONE Medical Alert - Get Help  I have ANY of these:    I feel awful    Medicine is not helping    Breathing getting harder    Trouble walking or talking    Nose opens wide to breathe       1. Take your rescue medicine NOW  2. If your provider has prescribed an oral steroid medicine, start taking it NOW  3. Call your doctor NOW  4. If you are still in the Red Zone after 20 minutes and you have not reached your doctor:    Take your rescue medicine again and    Call 911 or go to the emergency room right away    See your regular  doctor within 2 weeks of an Emergency Room or Urgent Care visit for follow-up treatment.          Annual Reminders:  Meet with Asthma Educator,  Flu Shot in the Fall, consider Pneumonia Vaccination for patients with asthma (aged 19 and older).    Pharmacy: [id number] [place] [address]    Electronically signed by Olivia Ascencio MD   Date: 01/25/22                    Asthma Triggers  How To Control Things That Make Your Asthma Worse    Triggers are things that make your asthma worse.  Look at the list below to help you find your triggers and   what you can do about them. You can help prevent asthma flare-ups by staying away from your triggers.      Trigger                                                          What you can do   Cigarette Smoke  Tobacco smoke can make asthma worse. Do not allow smoking in your home, car or around you.  Be sure no one smokes at a child s day care or school.  If you smoke, ask your health care provider for ways to help you quit.  Ask family members to quit too.  Ask your health care provider for a referral to Quit Plan to help you quit smoking, or call 3-696-951-PLAN.     Colds, Flu, Bronchitis  These are common triggers of asthma. Wash your hands often.  Don t touch your eyes, nose or mouth.  Get a flu shot every year.     Dust Mites  These are tiny bugs that live in cloth or carpet. They are too small to see. Wash sheets and blankets in hot water every week.   Encase pillows and mattress in dust mite proof covers.  Avoid having carpet if you can. If you have carpet, vacuum weekly.   Use a dust mask and HEPA vacuum.   Pollen and Outdoor Mold  Some people are allergic to trees, grass, or weed pollen, or molds. Try to keep your windows closed.  Limit time out doors when pollen count is high.   Ask you health care provider about taking medicine during allergy season.     Animal Dander  Some people are allergic to skin flakes, urine or saliva from pets with  fur or feathers. Keep pets with fur or feathers out of your home.    If you can t keep the pet outdoors, then keep the pet out of your bedroom.  Keep the bedroom door closed.  Keep pets off cloth furniture and away from stuffed toys.     Mice, Rats, and Cockroaches  Some people are allergic to the waste from these pests.   Cover food and garbage.  Clean up spills and food crumbs.  Store grease in the refrigerator.   Keep food out of the bedroom.   Indoor Mold  This can be a trigger if your home has high moisture. Fix leaking faucets, pipes, or other sources of water.   Clean moldy surfaces.  Dehumidify basement if it is damp and smelly.   Smoke, Strong Odors, and Sprays  These can reduce air quality. Stay away from strong odors and sprays, such as perfume, powder, hair spray, paints, smoke incense, paint, cleaning products, candles and new carpet.   Exercise or Sports  Some people with asthma have this trigger. Be active!  Ask your doctor about taking medicine before sports or exercise to prevent symptoms.    Warm up for 5-10 minutes before and after sports or exercise.     Other Triggers of Asthma  Cold air:  Cover your nose and mouth with a scarf.  Sometimes laughing or crying can be a trigger.  Some medicines and food can trigger asthma.

## 2022-01-30 ENCOUNTER — TRANSFERRED RECORDS (OUTPATIENT)
Dept: HEALTH INFORMATION MANAGEMENT | Facility: CLINIC | Age: 52
End: 2022-01-30
Payer: COMMERCIAL

## 2022-01-30 LAB — COLOGUARD-ABSTRACT: NEGATIVE

## 2022-02-21 ENCOUNTER — HOSPITAL ENCOUNTER (OUTPATIENT)
Dept: PHYSICAL THERAPY | Facility: REHABILITATION | Age: 52
End: 2022-02-21
Payer: COMMERCIAL

## 2022-02-21 DIAGNOSIS — M24.552 HIP FLEXOR TENDON TIGHTNESS, LEFT: ICD-10-CM

## 2022-02-21 DIAGNOSIS — M25.552 HIP PAIN, LEFT: Primary | ICD-10-CM

## 2022-02-21 PROCEDURE — 97161 PT EVAL LOW COMPLEX 20 MIN: CPT | Mod: GP | Performed by: PHYSICAL THERAPIST

## 2022-02-21 PROCEDURE — 97140 MANUAL THERAPY 1/> REGIONS: CPT | Mod: GP | Performed by: PHYSICAL THERAPIST

## 2022-02-21 PROCEDURE — 97110 THERAPEUTIC EXERCISES: CPT | Mod: GP | Performed by: PHYSICAL THERAPIST

## 2022-02-21 NOTE — PROGRESS NOTES
"   02/21/22 1000   General Information   Type of Visit Initial OP Ortho PT Evaluation   Start of Care Date 02/21/22   Referring Physician Dr. Olivia Ascencio    Patient/Family Goals Statement left hip flexor    Orders Evaluate and Treat   Date of Order 01/25/22   Certification Required? Yes   Medical Diagnosis left hip pain    Surgical/Medical history reviewed Yes   General Information Comments Exericse: 2x a week spinning, 2-3 miles every few weeks, n[place] track, walk a lot, 1x a week karate class and pilates at times.    Body Part(s)   Body Part(s) Hip   Presentation and Etiology   Pertinent history of current problem (include personal factors and/or comorbidities that impact the POC) Left hip flexor pain started in September 2021.  Drive to work is 30 min - started noticing increased pain with driving.   Similar pain to what she was experiencing a few years ago.    Impairments A. Pain;D. Decreased ROM;E. Decreased flexibility;F. Decreased strength and endurance   Functional Limitations perform activities of daily living;perform required work activities;perform desired leisure / sports activities  (hard to sit cross legged. )   Symptom Location Constant \"inflammation\" in left hip flexors.    How/Where did it occur Other  (increaed driving - possible )   Onset date of current episode/exacerbation 09/01/21   Chronicity Recurrent   Pain rating (0-10 point scale) Best (/10);Worst (/10)   Best (/10) 4   Worst (/10) 5   Pain quality   (need to be popped, tightness, inflammed, burning )   Frequency of pain/symptoms A. Constant   Current Level of Function   Patient role/employment history A. Employed   Employment Comments     Fall Risk Screen   Fall screen completed by PT   Have you fallen 2 or more times in the past year? No   Have you fallen and had an injury in the past year? No   Is patient a fall risk? No   Abuse Screen (yes response referral indicated)   Feels Unsafe at Home or Work/School no   Feels " Threatened by Someone no   Does Anyone Try to Keep You From Having Contact with Others or Doing Things Outside Your Home? no   Physical Signs of Abuse Present no   Patient needs abuse support services and resources No   Hip Objective Findings   Side (if bilateral, select both right and left) Left   Lumbar ROM WFL - standing flexion, extension, SB, rotation    Pelvic Screen positive arterial scan - FCS    Palpation tightness in lumbar spinal erectors on L, iliacus on L   Left Hip ER PROM 90 on R and 85 on L - mild pain on L    Left Hip IR PROM 45 - painful on the L    Left Hip Ext PROM WFL bilaterally    Left Hip Flexion Strength 4 bilaterally    Left Hip Abduction Strength 4- on L, 4+ on R    Left Hip IR Strength some disfomfort on L  4/5 bilaterally    Left Hip ER Strength 4+    Left Knee Flexion Strength 5    Left Knee Extension Strength 5    Left Hamstring Flexibility 100+ bilaterally    Planned Therapy Interventions   Planned Therapy Interventions joint mobilization;manual therapy;neuromuscular re-education;ROM;strengthening;stretching   Clinical Impression   Criteria for Skilled Therapeutic Interventions Met yes, treatment indicated   PT Diagnosis hip flexor tendon tightness    Influenced by the following impairments tightness with ER of L hip, pain with IR of L hip, tenderness to iliac on L    Functional limitations due to impairments getting off the floor, sitting cross legged    Clinical Presentation Stable/Uncomplicated   Clinical Presentation Rationale unchanging    Clinical Decision Making (Complexity) Low complexity   Therapy Frequency 1 time/week   Predicted Duration of Therapy Intervention (days/wks) up to 8-10 sessions    Risk & Benefits of therapy have been explained Yes   Patient, Family & other staff in agreement with plan of care Yes   Education Assessment   Barriers to Learning No barriers   ORTHO GOALS   PT Ortho Eval Goals 1;2;3;4   Ortho Goal 1   Goal Identifier HEP    Goal Description Pt will  be independent with HEP to manage symptoms    Target Date 05/02/22   Ortho Goal 2   Goal Identifier Karate    Goal Description Pt will kick leg in Karate without pain in L hip flexor   Target Date 05/16/22   Ortho Goal 3   Goal Identifier Sitting crosslegged    Goal Description Pt will sit on floor at work without pain and improved ROM.    Target Date 05/02/22   Ortho Goal 4   Goal Identifier Getting off the floor    Goal Description Pt will get up off the floor without difficulty to allow for ease at work    Target Date 05/16/22   Total Evaluation Time   PT Dariela, Low Complexity Minutes (70541) 30   Therapy Certification   Medical Diagnosis left hip pain

## 2022-03-08 ENCOUNTER — HOSPITAL ENCOUNTER (OUTPATIENT)
Dept: PHYSICAL THERAPY | Facility: REHABILITATION | Age: 52
End: 2022-03-08
Payer: COMMERCIAL

## 2022-03-08 DIAGNOSIS — M24.552 HIP FLEXOR TENDON TIGHTNESS, LEFT: ICD-10-CM

## 2022-03-08 DIAGNOSIS — M25.552 HIP PAIN, LEFT: Primary | ICD-10-CM

## 2022-03-08 PROCEDURE — 97110 THERAPEUTIC EXERCISES: CPT | Mod: GP | Performed by: PHYSICAL THERAPIST

## 2022-03-14 ENCOUNTER — HOSPITAL ENCOUNTER (OUTPATIENT)
Dept: PHYSICAL THERAPY | Facility: REHABILITATION | Age: 52
Discharge: HOME | End: 2022-03-14
Payer: COMMERCIAL

## 2022-03-14 DIAGNOSIS — M24.552 HIP FLEXOR TENDON TIGHTNESS, LEFT: ICD-10-CM

## 2022-03-14 DIAGNOSIS — M25.552 HIP PAIN, LEFT: Primary | ICD-10-CM

## 2022-03-14 PROCEDURE — 97110 THERAPEUTIC EXERCISES: CPT | Mod: GP | Performed by: PHYSICAL THERAPIST

## 2022-04-25 ENCOUNTER — HOSPITAL ENCOUNTER (OUTPATIENT)
Dept: PHYSICAL THERAPY | Facility: REHABILITATION | Age: 52
Discharge: HOME | End: 2022-04-25
Payer: COMMERCIAL

## 2022-04-25 DIAGNOSIS — M24.552 HIP FLEXOR TENDON TIGHTNESS, LEFT: ICD-10-CM

## 2022-04-25 DIAGNOSIS — M25.552 HIP PAIN, LEFT: Primary | ICD-10-CM

## 2022-04-25 PROCEDURE — 97110 THERAPEUTIC EXERCISES: CPT | Mod: GP | Performed by: PHYSICAL THERAPIST

## 2022-04-25 PROCEDURE — 97140 MANUAL THERAPY 1/> REGIONS: CPT | Mod: GP | Performed by: PHYSICAL THERAPIST

## 2022-05-22 ENCOUNTER — HEALTH MAINTENANCE LETTER (OUTPATIENT)
Age: 52
End: 2022-05-22

## 2022-06-13 NOTE — ADDENDUM NOTE
Encounter addended by: Soila Steiner, PT on: 6/13/2022 10:00 AM   Actions taken: Episode resolved, Flowsheet accepted, Pend clinical note

## 2022-06-13 NOTE — PROGRESS NOTES
[facility]    Outpatient Physical Therapy Discharge Note  Patient: Jennifer Barnes  : 1970    Beginning/End Dates of Reporting Period:  22 to 22  Referring Provider: Dr. Ascencio    Therapy Diagnosis: hip flexor tendon tightness      Client Self Report: Has been feeling much better- can feel some at night when sleeping - but no pain.  Has not done spin class for the last 4 weeks.  Walking, [place], Biking, Karate    Objective Measurements:  Objective Measure: hip ER / IR  Details: 90 on R, 85 on L - no pain - stretching / IR 45 on R, 40 on L with pinch  Objective Measure:  on R / 100 on L  Details: hip abd MMT 4+ on R and 4- on L                              Outcome Measures (most recent score):  LEF improved from 65 to 77/80    Goals:  Goal Identifier HEP    Goal Description Pt will be independent with HEP to manage symptoms    Target Date 22   Date Met  22   Progress (detail required for progress note):       Goal Identifier Karate    Goal Description Pt will kick leg in Karate without pain in L hip flexor   Target Date 22   Date Met  22   Progress (detail required for progress note): Did a lot of kicks in last Karate class - with no pain     Goal Identifier Sitting crosslegged    Goal Description Pt will sit on floor at work without pain and improved ROM.    Target Date 22   Date Met      Progress (detail required for progress note): Still is sore - but improved ROM - assessed in the clinic     Goal Identifier Getting off the floor    Goal Description Pt will get up off the floor without difficulty to allow for ease at work    Target Date 22   Date Met  22   Progress (detail required for progress note): Has improved.  No longer has catch     Goal Identifier     Goal Description     Target Date     Date Met      Progress (detail required  for progress note):       Goal Identifier     Goal Description     Target Date     Date Met      Progress (detail required for progress note):       Goal Identifier     Goal Description     Target Date     Date Met      Progress (detail required for progress note):       Goal Identifier     Goal Description     Target Date     Date Met      Progress (detail required for progress note):             Plan:  Discharge from therapy.    Discharge:    Reason for Discharge: Patient has met all goals.    Equipment Issued: none    Discharge Plan: Patient to continue home program.

## 2022-06-14 ENCOUNTER — ANCILLARY PROCEDURE (OUTPATIENT)
Dept: MAMMOGRAPHY | Facility: CLINIC | Age: 52
End: 2022-06-14
Attending: FAMILY MEDICINE
Payer: COMMERCIAL

## 2022-06-14 DIAGNOSIS — Z12.31 VISIT FOR SCREENING MAMMOGRAM: ICD-10-CM

## 2022-06-14 PROCEDURE — 77067 SCR MAMMO BI INCL CAD: CPT | Mod: TC | Performed by: RADIOLOGY

## 2022-09-24 ENCOUNTER — HEALTH MAINTENANCE LETTER (OUTPATIENT)
Age: 52
End: 2022-09-24

## 2023-03-30 ENCOUNTER — OFFICE VISIT (OUTPATIENT)
Dept: FAMILY MEDICINE | Facility: CLINIC | Age: 53
End: 2023-03-30
Payer: COMMERCIAL

## 2023-03-30 VITALS
OXYGEN SATURATION: 100 % | WEIGHT: 132 LBS | HEIGHT: 63 IN | TEMPERATURE: 98.3 F | RESPIRATION RATE: 12 BRPM | HEART RATE: 56 BPM | BODY MASS INDEX: 23.39 KG/M2 | DIASTOLIC BLOOD PRESSURE: 63 MMHG | SYSTOLIC BLOOD PRESSURE: 102 MMHG

## 2023-03-30 DIAGNOSIS — R23.2 HOT FLASHES: ICD-10-CM

## 2023-03-30 DIAGNOSIS — H91.90 DECREASED HEARING, UNSPECIFIED LATERALITY: ICD-10-CM

## 2023-03-30 DIAGNOSIS — Z00.00 ROUTINE GENERAL MEDICAL EXAMINATION AT A HEALTH CARE FACILITY: Primary | ICD-10-CM

## 2023-03-30 DIAGNOSIS — Z13.220 SCREENING FOR LIPID DISORDERS: ICD-10-CM

## 2023-03-30 DIAGNOSIS — R39.15 URINARY URGENCY: ICD-10-CM

## 2023-03-30 LAB
ALBUMIN SERPL BCG-MCNC: 4.1 G/DL (ref 3.5–5.2)
ALP SERPL-CCNC: 65 U/L (ref 35–104)
ALT SERPL W P-5'-P-CCNC: 31 U/L (ref 10–35)
ANION GAP SERPL CALCULATED.3IONS-SCNC: 11 MMOL/L (ref 7–15)
AST SERPL W P-5'-P-CCNC: 31 U/L (ref 10–35)
BILIRUB SERPL-MCNC: 0.5 MG/DL
BUN SERPL-MCNC: 14.5 MG/DL (ref 6–20)
CALCIUM SERPL-MCNC: 9.2 MG/DL (ref 8.6–10)
CHLORIDE SERPL-SCNC: 104 MMOL/L (ref 98–107)
CHOLEST SERPL-MCNC: 179 MG/DL
CREAT SERPL-MCNC: 0.95 MG/DL (ref 0.51–0.95)
DEPRECATED HCO3 PLAS-SCNC: 24 MMOL/L (ref 22–29)
ERYTHROCYTE [DISTWIDTH] IN BLOOD BY AUTOMATED COUNT: 11.9 % (ref 10–15)
GFR SERPL CREATININE-BSD FRML MDRD: 72 ML/MIN/1.73M2
GLUCOSE SERPL-MCNC: 80 MG/DL (ref 70–99)
HCT VFR BLD AUTO: 39.6 % (ref 35–47)
HDLC SERPL-MCNC: 73 MG/DL
HGB BLD-MCNC: 13.4 G/DL (ref 11.7–15.7)
LDLC SERPL CALC-MCNC: 97 MG/DL
MCH RBC QN AUTO: 31.6 PG (ref 26.5–33)
MCHC RBC AUTO-ENTMCNC: 33.8 G/DL (ref 31.5–36.5)
MCV RBC AUTO: 93 FL (ref 78–100)
NONHDLC SERPL-MCNC: 106 MG/DL
PLATELET # BLD AUTO: 150 10E3/UL (ref 150–450)
POTASSIUM SERPL-SCNC: 3.9 MMOL/L (ref 3.4–5.3)
PROT SERPL-MCNC: 7 G/DL (ref 6.4–8.3)
RBC # BLD AUTO: 4.24 10E6/UL (ref 3.8–5.2)
SODIUM SERPL-SCNC: 139 MMOL/L (ref 136–145)
T4 FREE SERPL-MCNC: 1.14 NG/DL (ref 0.9–1.7)
TRIGL SERPL-MCNC: 45 MG/DL
TSH SERPL DL<=0.005 MIU/L-ACNC: 5.54 UIU/ML (ref 0.3–4.2)
WBC # BLD AUTO: 5 10E3/UL (ref 4–11)

## 2023-03-30 PROCEDURE — 80053 COMPREHEN METABOLIC PANEL: CPT | Performed by: FAMILY MEDICINE

## 2023-03-30 PROCEDURE — 99213 OFFICE O/P EST LOW 20 MIN: CPT | Mod: 25 | Performed by: FAMILY MEDICINE

## 2023-03-30 PROCEDURE — 80061 LIPID PANEL: CPT | Performed by: FAMILY MEDICINE

## 2023-03-30 PROCEDURE — 36415 COLL VENOUS BLD VENIPUNCTURE: CPT | Performed by: FAMILY MEDICINE

## 2023-03-30 PROCEDURE — 84443 ASSAY THYROID STIM HORMONE: CPT | Performed by: FAMILY MEDICINE

## 2023-03-30 PROCEDURE — 84439 ASSAY OF FREE THYROXINE: CPT | Performed by: FAMILY MEDICINE

## 2023-03-30 PROCEDURE — 85027 COMPLETE CBC AUTOMATED: CPT | Performed by: FAMILY MEDICINE

## 2023-03-30 PROCEDURE — 99396 PREV VISIT EST AGE 40-64: CPT | Performed by: FAMILY MEDICINE

## 2023-03-30 ASSESSMENT — ASTHMA QUESTIONNAIRES
QUESTION_2 LAST FOUR WEEKS HOW OFTEN HAVE YOU HAD SHORTNESS OF BREATH: ONCE OR TWICE A WEEK
QUESTION_3 LAST FOUR WEEKS HOW OFTEN DID YOUR ASTHMA SYMPTOMS (WHEEZING, COUGHING, SHORTNESS OF BREATH, CHEST TIGHTNESS OR PAIN) WAKE YOU UP AT NIGHT OR EARLIER THAN USUAL IN THE MORNING: ONCE OR TWICE
ACT_TOTALSCORE: 21
QUESTION_4 LAST FOUR WEEKS HOW OFTEN HAVE YOU USED YOUR RESCUE INHALER OR NEBULIZER MEDICATION (SUCH AS ALBUTEROL): TWO OR THREE TIMES PER WEEK
ACT_TOTALSCORE: 21
QUESTION_1 LAST FOUR WEEKS HOW MUCH OF THE TIME DID YOUR ASTHMA KEEP YOU FROM GETTING AS MUCH DONE AT WORK, SCHOOL OR AT HOME: NONE OF THE TIME
QUESTION_5 LAST FOUR WEEKS HOW WOULD YOU RATE YOUR ASTHMA CONTROL: COMPLETELY CONTROLLED

## 2023-03-30 ASSESSMENT — ENCOUNTER SYMPTOMS
HEARTBURN: 0
DYSURIA: 0
SHORTNESS OF BREATH: 0
FEVER: 0
HEADACHES: 0
SORE THROAT: 0
ARTHRALGIAS: 0
FREQUENCY: 1
JOINT SWELLING: 0
WEAKNESS: 0
BREAST MASS: 0
ABDOMINAL PAIN: 0
DIARRHEA: 0
CONSTIPATION: 0
COUGH: 0
CHILLS: 0
NAUSEA: 0
DIZZINESS: 0
HEMATOCHEZIA: 0
PALPITATIONS: 0
HEMATURIA: 0
NERVOUS/ANXIOUS: 0
PARESTHESIAS: 0
MYALGIAS: 0
EYE PAIN: 0

## 2023-03-30 NOTE — PROGRESS NOTES
SUBJECTIVE:   CC: Jennifer is an 52 year old who presents for preventive health visit.     Additional Questions 3/30/23   Roomed by Nancy NGUYEN LPN   Patient has been advised of split billing requirements and indicates understanding: Yes     Healthy Habits:     Getting at least 3 servings of Calcium per day:  Yes    Bi-annual eye exam:  Yes    Dental care twice a year:  Yes    Sleep apnea or symptoms of sleep apnea:  None    Diet:  Regular (no restrictions)    Frequency of exercise:  4-5 days/week    Duration of exercise:  30-45 minutes    Taking medications regularly:  Yes    Medication side effects:  Not applicable    PHQ-2 Total Score: 0    Additional concerns today:  Yes    Problems to Add:  1. Feels that she may have a touch of hearing loss.  In the classroom, feels that she is not hearing as well as she used to.  Seems mild.  2. Feels that she needs to use the bathroom more frequently.  No burning or pain.  No incontinence.      HEARING FREQUENCY    Right Ear:      1000 Hz RESPONSE- on Level:   40 db  (Conditioning sound)   1000 Hz: RESPONSE- on Level:   20 db    2000 Hz: RESPONSE- on Level:   20 db    4000 Hz: RESPONSE- on Level:   20 db     Left Ear:      4000 Hz: RESPONSE- on Level:   20 db    2000 Hz: RESPONSE- on Level:   20 db    1000 Hz: RESPONSE- on Level:   20 db     500 Hz: RESPONSE- on Level: 25 db    Right Ear:    500 Hz: RESPONSE- on Level: 25 db    Hearing Acuity: Pass    Asthma Follow-Up    Was ACT completed today?    Yes    ACT Total Scores 3/30/2023   ACT TOTAL SCORE (Goal Greater than or Equal to 20) 21   In the past 12 months, how many times did you visit the emergency room for your asthma without being admitted to the hospital? 0   In the past 12 months, how many times were you hospitalized overnight because of your asthma? 0          How many days per week do you miss taking your asthma controller medication?  I do not have an asthma controller medication    Please describe any recent  triggers for your asthma: dust mites and exercise or sports    Have you had any Emergency Room Visits, Urgent Care Visits, or Hospital Admissions since your last office visit?  No    Today's PHQ-2 Score:   PHQ-2 (  [employer]) 3/30/2023   Q1: Little interest or pleasure in doing things 0   Q2: Feeling down, depressed or hopeless 0   PHQ-2 Score 0   Q1: Little interest or pleasure in doing things Not at all   Q2: Feeling down, depressed or hopeless Not at all   PHQ-2 Score 0     Social History     Tobacco Use     Smoking status: Former     Packs/day: 0.10     Years: 10.00     Pack years: 1.00     Types: Cigarettes     Quit date: 2017     Years since quittin.9     Smokeless tobacco: Never   Substance Use Topics     Alcohol use: Yes     Alcohol/week: 4.0 standard drinks     Comment: 7/wk     Alcohol Use 3/30/2023   Prescreen: >3 drinks/day or >7 drinks/week? No     Reviewed orders with patient.  Reviewed health maintenance and updated orders accordingly - Yes    BP Readings from Last 3 Encounters:   23 102/63   22 117/56   21 104/62    Wt Readings from Last 3 Encounters:   23 59.9 kg (132 lb)   22 57.2 kg (126 lb)   21 59 kg (130 lb 1.6 oz)                  Patient Active Problem List   Diagnosis     Psoriasis     Mild intermittent asthma without complication     Hot flashes     ASCUS of cervix with negative high risk HPV     Allergic rhinitis due to pollen     Hip pain, left     Itchy skin     Hair loss     Flexural atopic dermatitis     Flatulence, eructation and gas pain     Past Surgical History:   Procedure Laterality Date     MOUTH SURGERY      dental-related and wisdom teeth (x4)       Social History     Tobacco Use     Smoking status: Former     Packs/day: 0.10     Years: 10.00     Pack years: 1.00     Types: Cigarettes     Quit date: 2017     Years since quittin.9     Smokeless tobacco: Never   Substance Use Topics     Alcohol use: Yes     Alcohol/week: 4.0  standard drinks     Comment: 7/wk     Family History   Adopted: Yes   Problem Relation Age of Onset     Breast Cancer Mother 61        genetic testing neg     Thyroid Disease Mother      Cancer Maternal Grandmother         HCC     Alcoholism Maternal Grandmother      Colon Cancer No family hx of      Diabetes No family hx of      Heart Disease No family hx of      Ovarian Cancer No family hx of          Current Outpatient Medications   Medication Sig Dispense Refill     albuterol (PROVENTIL HFA;VENTOLIN HFA) 90 mcg/actuation inhaler [ALBUTEROL (PROVENTIL HFA;VENTOLIN HFA) 90 MCG/ACTUATION INHALER] Inhale 2 puffs every 6 (six) hours as needed for wheezing.       clobetasol (TEMOVATE) 0.05 % GEL topical gel        flurandrenolide (CORDRAN) 0.05 % external lotion        loratadine (CLARITIN) 10 MG tablet Take 10 mg by mouth as needed       Allergies   Allergen Reactions     Other Environmental Allergy Other (See Comments)     Red eyes and sneezing., DUST       Breast Cancer Screening:    FHS-7:   Breast CA Risk Assessment (S-7) 1/25/2022 6/14/2022 3/30/2023   Did any of your first-degree relatives have breast or ovarian cancer? Yes Yes No   Did any of your relatives have bilateral breast cancer? No No No   Did any man in your family have breast cancer? No No No   Did any woman in your family have breast and ovarian cancer? Yes No Yes   Did any woman in your family have breast cancer before age 50 y? No No No   Do you have 2 or more relatives with breast and/or ovarian cancer? No No No   Do you have 2 or more relatives with breast and/or bowel cancer? No No No       Mammogram Screening: Recommended annual mammography  Pertinent mammograms are reviewed under the imaging tab.    History of abnormal Pap smear: YES - updated in Problem List and Health Maintenance accordingly  PAP / HPV Latest Ref Rng & Units 2/22/2021   PAP Negative for squamous intraepithelial lesion or malignancy. Atypical squamous cells of undetermined  "significance  Electronically signed by Pj Carr MD on 3/1/2021 at 10:16 AM  (A)   HPV16 NEG Negative   HPV18 NEG Negative   HRHPV NEG Negative     Reviewed and updated as needed this visit by clinical staff   Tobacco  Allergies  Meds  Problems             Reviewed and updated as needed this visit by Provider   Tobacco  Allergies  Meds  Problems  Med Hx  Surg Hx  Fam Hx  Soc   Hx         Review of Systems   Constitutional: Negative for chills and fever.   HENT: Positive for hearing loss. Negative for congestion, ear pain and sore throat.    Eyes: Negative for pain and visual disturbance.   Respiratory: Negative for cough and shortness of breath.    Cardiovascular: Negative for chest pain, palpitations and peripheral edema.   Gastrointestinal: Negative for abdominal pain, constipation, diarrhea, heartburn, hematochezia and nausea.   Breasts:  Negative for tenderness, breast mass and discharge.   Genitourinary: Positive for frequency and urgency. Negative for dysuria, genital sores, hematuria, pelvic pain, vaginal bleeding and vaginal discharge.   Musculoskeletal: Negative for arthralgias, joint swelling and myalgias.   Skin: Negative for rash.   Neurological: Negative for dizziness, weakness, headaches and paresthesias.   Psychiatric/Behavioral: Negative for mood changes. The patient is not nervous/anxious.         OBJECTIVE:   /63   Pulse 56   Temp 98.3  F (36.8  C) (Oral)   Resp 12   Ht 1.6 m (5' 3\")   Wt 59.9 kg (132 lb)   LMP 09/09/2022 (Approximate)   SpO2 100%   BMI 23.38 kg/m    Physical Exam  GENERAL APPEARANCE: healthy, alert and no distress  EYES: Eyes grossly normal to inspection, PERRL and conjunctivae and sclerae normal  HENT: ear canals and TM's normal, nose and mouth without ulcers or lesions, oropharynx clear and oral mucous membranes moist  NECK: no adenopathy, no asymmetry, masses, or scars and thyroid normal to palpation  RESP: lungs clear to auscultation - no " rales, rhonchi or wheezes  BREAST: normal without masses, tenderness or nipple discharge and no palpable axillary masses or adenopathy  CV: regular rate and rhythm, normal S1 S2, no S3 or S4, no murmur, click or rub, no peripheral edema and peripheral pulses strong  ABDOMEN: soft, nontender, no hepatosplenomegaly, no masses and bowel sounds normal  MS: no musculoskeletal defects are noted and gait is age appropriate without ataxia  SKIN: no suspicious lesions or rashes  NEURO: Normal strength and tone, sensory exam grossly normal, mentation intact and speech normal  PSYCH: mentation appears normal and affect normal/bright    Diagnostic Test Results:  Labs reviewed in Epic  Results for orders placed or performed in visit on 03/30/23   Lipid panel reflex to direct LDL Fasting     Status: Normal   Result Value Ref Range    Cholesterol 179 <200 mg/dL    Triglycerides 45 <150 mg/dL    Direct Measure HDL 73 >=50 mg/dL    LDL Cholesterol Calculated 97 <=100 mg/dL    Non HDL Cholesterol 106 <130 mg/dL    Narrative    Cholesterol  Desirable:  <200 mg/dL    Triglycerides  Normal:  Less than 150 mg/dL  Borderline High:  150-199 mg/dL  High:  200-499 mg/dL  Very High:  Greater than or equal to 500 mg/dL    Direct Measure HDL  Female:  Greater than or equal to 50 mg/dL   Male:  Greater than or equal to 40 mg/dL    LDL Cholesterol  Desirable:  <100mg/dL  Above Desirable:  100-129 mg/dL   Borderline High:  130-159 mg/dL   High:  160-189 mg/dL   Very High:  >= 190 mg/dL    Non HDL Cholesterol  Desirable:  130 mg/dL  Above Desirable:  130-159 mg/dL  Borderline High:  160-189 mg/dL  High:  190-219 mg/dL  Very High:  Greater than or equal to 220 mg/dL   CBC with platelets     Status: Normal   Result Value Ref Range    WBC Count 5.0 4.0 - 11.0 10e3/uL    RBC Count 4.24 3.80 - 5.20 10e6/uL    Hemoglobin 13.4 11.7 - 15.7 g/dL    Hematocrit 39.6 35.0 - 47.0 %    MCV 93 78 - 100 fL    MCH 31.6 26.5 - 33.0 pg    MCHC 33.8 31.5 - 36.5 g/dL     RDW 11.9 10.0 - 15.0 %    Platelet Count 150 150 - 450 10e3/uL   Comprehensive metabolic panel (BMP + Alb, Alk Phos, ALT, AST, Total. Bili, TP)     Status: Normal   Result Value Ref Range    Sodium 139 136 - 145 mmol/L    Potassium 3.9 3.4 - 5.3 mmol/L    Chloride 104 98 - 107 mmol/L    Carbon Dioxide (CO2) 24 22 - 29 mmol/L    Anion Gap 11 7 - 15 mmol/L    Urea Nitrogen 14.5 6.0 - 20.0 mg/dL    Creatinine 0.95 0.51 - 0.95 mg/dL    Calcium 9.2 8.6 - 10.0 mg/dL    Glucose 80 70 - 99 mg/dL    Alkaline Phosphatase 65 35 - 104 U/L    AST 31 10 - 35 U/L    ALT 31 10 - 35 U/L    Protein Total 7.0 6.4 - 8.3 g/dL    Albumin 4.1 3.5 - 5.2 g/dL    Bilirubin Total 0.5 <=1.2 mg/dL    GFR Estimate 72 >60 mL/min/1.73m2   TSH with free T4 reflex     Status: Abnormal   Result Value Ref Range    TSH 5.54 (H) 0.30 - 4.20 uIU/mL   T4 free     Status: Normal   Result Value Ref Range    Free T4 1.14 0.90 - 1.70 ng/dL       ASSESSMENT/PLAN:   1. Routine general medical examination at a health care facility  Routine history and physical, updated in EMR.  Fasting labs updated as below.  Immunizations are up to date with the exception of COVID booster, patient declines today.  Pap smear is up to date, will be due next year.  Mammogram will be due in June.  Colon cancer screening is up to date also, will be due in 2025.  Plan repeat physical in 1 year.  - CBC with platelets  - Comprehensive metabolic panel (BMP + Alb, Alk Phos, ALT, AST, Total. Bili, TP)  - TSH with free T4 reflex  - T4 free    2. Hot flashes  Likely perimenopausal, and mild.  Discussed black cohosh if she would like to try.  Overall mild.  Labs as below are normal with the exception of subclinical hypothyroidism.  - CBC with platelets  - Comprehensive metabolic panel (BMP + Alb, Alk Phos, ALT, AST, Total. Bili, TP)  - TSH with free T4 reflex  - T4 free    3. Urinary urgency  Discussed and gave a handout about bladder retraining exercises.  No signs/symptoms of infection  and patient hopes not to use a medication.    4. Decreased hearing, unspecified laterality  Audiometry in office today is normal.  - PURE TONE AUDIOMETRY,AIR    5. Screening for lipid disorders  Lipid profile updated today and normal.  - Lipid panel reflex to direct LDL Fasting      Patient has been advised of split billing requirements and indicates understanding: Yes      COUNSELING:  Reviewed preventive health counseling, as reflected in patient instructions  Special attention given to:        Regular exercise       Healthy diet/nutrition       Hearing screening       The 10-year ASCVD risk score (Vinod BACK, et al., 2019) is: 0.5%    Values used to calculate the score:      Age: 52 years      Sex: Female      Is Non- : No      Diabetic: No      Tobacco smoker: No      Systolic Blood Pressure: 102 mmHg      Is BP treated: No      HDL Cholesterol: 73 mg/dL      Total Cholesterol: 179 mg/dL        She reports that she quit smoking about 5 years ago. She has a 1.00 pack-year smoking history. She has never used smokeless tobacco.           Olivia Ascencio MD  [facility]

## 2023-04-01 PROBLEM — R39.15 URINARY URGENCY: Status: ACTIVE | Noted: 2023-04-01

## 2023-06-03 ENCOUNTER — MYC MEDICAL ADVICE (OUTPATIENT)
Dept: FAMILY MEDICINE | Facility: CLINIC | Age: 53
End: 2023-06-03
Payer: COMMERCIAL

## 2023-06-03 DIAGNOSIS — E03.8 SUBCLINICAL HYPOTHYROIDISM: Primary | ICD-10-CM

## 2023-06-11 ENCOUNTER — LAB (OUTPATIENT)
Dept: LAB | Facility: CLINIC | Age: 53
End: 2023-06-11
Payer: COMMERCIAL

## 2023-06-11 DIAGNOSIS — E03.8 SUBCLINICAL HYPOTHYROIDISM: ICD-10-CM

## 2023-06-11 LAB
T4 FREE SERPL-MCNC: 0.99 NG/DL (ref 0.9–1.7)
TSH SERPL DL<=0.005 MIU/L-ACNC: 4.61 UIU/ML (ref 0.3–4.2)

## 2023-06-11 PROCEDURE — 84439 ASSAY OF FREE THYROXINE: CPT

## 2023-06-11 PROCEDURE — 84443 ASSAY THYROID STIM HORMONE: CPT

## 2023-06-11 PROCEDURE — 36415 COLL VENOUS BLD VENIPUNCTURE: CPT

## 2023-06-16 ENCOUNTER — ANCILLARY PROCEDURE (OUTPATIENT)
Dept: MAMMOGRAPHY | Facility: CLINIC | Age: 53
End: 2023-06-16
Attending: FAMILY MEDICINE
Payer: COMMERCIAL

## 2023-06-16 DIAGNOSIS — Z12.31 VISIT FOR SCREENING MAMMOGRAM: ICD-10-CM

## 2023-06-16 PROCEDURE — 77067 SCR MAMMO BI INCL CAD: CPT | Mod: TC | Performed by: RADIOLOGY

## 2023-12-11 ENCOUNTER — TELEPHONE (OUTPATIENT)
Dept: FAMILY MEDICINE | Facility: CLINIC | Age: 53
End: 2023-12-11
Payer: COMMERCIAL

## 2023-12-11 DIAGNOSIS — E03.8 SUBCLINICAL HYPOTHYROIDISM: Primary | ICD-10-CM

## 2023-12-11 NOTE — TELEPHONE ENCOUNTER
Called and left message to return call. When pt returns call please relay message below.     Please call patient and let her know that she is due for recheck of her thyroid labs.  Order is in, she can schedule lab visit at her convenience, thanks.

## 2023-12-12 ENCOUNTER — TELEPHONE (OUTPATIENT)
Dept: FAMILY MEDICINE | Facility: CLINIC | Age: 53
End: 2023-12-12
Payer: COMMERCIAL

## 2023-12-26 ENCOUNTER — LAB (OUTPATIENT)
Dept: LAB | Facility: CLINIC | Age: 53
End: 2023-12-26
Payer: COMMERCIAL

## 2023-12-26 DIAGNOSIS — E03.8 SUBCLINICAL HYPOTHYROIDISM: ICD-10-CM

## 2023-12-26 LAB
T4 FREE SERPL-MCNC: 1.09 NG/DL (ref 0.9–1.7)
TSH SERPL DL<=0.005 MIU/L-ACNC: 6.56 UIU/ML (ref 0.3–4.2)

## 2023-12-26 PROCEDURE — 84443 ASSAY THYROID STIM HORMONE: CPT

## 2023-12-26 PROCEDURE — 84439 ASSAY OF FREE THYROXINE: CPT

## 2023-12-26 PROCEDURE — 36415 COLL VENOUS BLD VENIPUNCTURE: CPT

## 2023-12-29 ASSESSMENT — ENCOUNTER SYMPTOMS
NAUSEA: 0
HEMATOCHEZIA: 0
CONSTIPATION: 0
DIZZINESS: 0
JOINT SWELLING: 0
PARESTHESIAS: 0
HEARTBURN: 0
PALPITATIONS: 0
MYALGIAS: 0
BREAST MASS: 0
FREQUENCY: 0
HEMATURIA: 0
NERVOUS/ANXIOUS: 0
CHILLS: 0
WEAKNESS: 0
SORE THROAT: 0
DIARRHEA: 0
SHORTNESS OF BREATH: 0
EYE PAIN: 0
ARTHRALGIAS: 0
DYSURIA: 0
ABDOMINAL PAIN: 0
HEADACHES: 0
FEVER: 0
COUGH: 0

## 2023-12-29 ASSESSMENT — ASTHMA QUESTIONNAIRES
ACT_TOTALSCORE: 21
QUESTION_3 LAST FOUR WEEKS HOW OFTEN DID YOUR ASTHMA SYMPTOMS (WHEEZING, COUGHING, SHORTNESS OF BREATH, CHEST TIGHTNESS OR PAIN) WAKE YOU UP AT NIGHT OR EARLIER THAN USUAL IN THE MORNING: ONCE OR TWICE
ACT_TOTALSCORE: 21
QUESTION_4 LAST FOUR WEEKS HOW OFTEN HAVE YOU USED YOUR RESCUE INHALER OR NEBULIZER MEDICATION (SUCH AS ALBUTEROL): TWO OR THREE TIMES PER WEEK
QUESTION_5 LAST FOUR WEEKS HOW WOULD YOU RATE YOUR ASTHMA CONTROL: COMPLETELY CONTROLLED
QUESTION_1 LAST FOUR WEEKS HOW MUCH OF THE TIME DID YOUR ASTHMA KEEP YOU FROM GETTING AS MUCH DONE AT WORK, SCHOOL OR AT HOME: NONE OF THE TIME
QUESTION_2 LAST FOUR WEEKS HOW OFTEN HAVE YOU HAD SHORTNESS OF BREATH: ONCE OR TWICE A WEEK

## 2024-01-04 ENCOUNTER — OFFICE VISIT (OUTPATIENT)
Dept: FAMILY MEDICINE | Facility: CLINIC | Age: 54
End: 2024-01-04
Payer: COMMERCIAL

## 2024-01-04 VITALS
TEMPERATURE: 97.6 F | RESPIRATION RATE: 16 BRPM | WEIGHT: 135 LBS | HEIGHT: 63 IN | HEART RATE: 62 BPM | OXYGEN SATURATION: 100 % | SYSTOLIC BLOOD PRESSURE: 104 MMHG | DIASTOLIC BLOOD PRESSURE: 66 MMHG | BODY MASS INDEX: 23.92 KG/M2

## 2024-01-04 DIAGNOSIS — E03.8 SUBCLINICAL HYPOTHYROIDISM: ICD-10-CM

## 2024-01-04 DIAGNOSIS — R87.610 ASCUS OF CERVIX WITH NEGATIVE HIGH RISK HPV: ICD-10-CM

## 2024-01-04 DIAGNOSIS — Z00.00 ROUTINE GENERAL MEDICAL EXAMINATION AT A HEALTH CARE FACILITY: Primary | ICD-10-CM

## 2024-01-04 DIAGNOSIS — Z13.220 LIPID SCREENING: ICD-10-CM

## 2024-01-04 DIAGNOSIS — R23.2 HOT FLASHES: ICD-10-CM

## 2024-01-04 DIAGNOSIS — Z12.4 CERVICAL CANCER SCREENING: ICD-10-CM

## 2024-01-04 PROBLEM — L65.9 HAIR LOSS: Status: RESOLVED | Noted: 2022-01-25 | Resolved: 2024-01-04

## 2024-01-04 PROBLEM — R39.15 URINARY URGENCY: Status: RESOLVED | Noted: 2023-04-01 | Resolved: 2024-01-04

## 2024-01-04 PROBLEM — M25.552 HIP PAIN, LEFT: Status: RESOLVED | Noted: 2022-01-25 | Resolved: 2024-01-04

## 2024-01-04 LAB
ALBUMIN SERPL BCG-MCNC: 4.2 G/DL (ref 3.5–5.2)
ALP SERPL-CCNC: 81 U/L (ref 40–150)
ALT SERPL W P-5'-P-CCNC: 42 U/L (ref 0–50)
ANION GAP SERPL CALCULATED.3IONS-SCNC: 10 MMOL/L (ref 7–15)
AST SERPL W P-5'-P-CCNC: 35 U/L (ref 0–45)
BILIRUB SERPL-MCNC: 0.6 MG/DL
BUN SERPL-MCNC: 12.3 MG/DL (ref 6–20)
CALCIUM SERPL-MCNC: 9.3 MG/DL (ref 8.6–10)
CHLORIDE SERPL-SCNC: 104 MMOL/L (ref 98–107)
CHOLEST SERPL-MCNC: 224 MG/DL
CREAT SERPL-MCNC: 0.92 MG/DL (ref 0.51–0.95)
DEPRECATED HCO3 PLAS-SCNC: 26 MMOL/L (ref 22–29)
EGFRCR SERPLBLD CKD-EPI 2021: 75 ML/MIN/1.73M2
ERYTHROCYTE [DISTWIDTH] IN BLOOD BY AUTOMATED COUNT: 12.5 % (ref 10–15)
FASTING STATUS PATIENT QL REPORTED: YES
GLUCOSE SERPL-MCNC: 76 MG/DL (ref 70–99)
HCT VFR BLD AUTO: 40.9 % (ref 35–47)
HDLC SERPL-MCNC: 88 MG/DL
HGB BLD-MCNC: 13.6 G/DL (ref 11.7–15.7)
LDLC SERPL CALC-MCNC: 125 MG/DL
MCH RBC QN AUTO: 32.1 PG (ref 26.5–33)
MCHC RBC AUTO-ENTMCNC: 33.3 G/DL (ref 31.5–36.5)
MCV RBC AUTO: 97 FL (ref 78–100)
NONHDLC SERPL-MCNC: 136 MG/DL
PLATELET # BLD AUTO: 183 10E3/UL (ref 150–450)
POTASSIUM SERPL-SCNC: 4.5 MMOL/L (ref 3.4–5.3)
PROT SERPL-MCNC: 7.2 G/DL (ref 6.4–8.3)
RBC # BLD AUTO: 4.24 10E6/UL (ref 3.8–5.2)
SODIUM SERPL-SCNC: 140 MMOL/L (ref 135–145)
TRIGL SERPL-MCNC: 56 MG/DL
WBC # BLD AUTO: 4.9 10E3/UL (ref 4–11)

## 2024-01-04 PROCEDURE — 80061 LIPID PANEL: CPT | Performed by: FAMILY MEDICINE

## 2024-01-04 PROCEDURE — 87624 HPV HI-RISK TYP POOLED RSLT: CPT | Performed by: FAMILY MEDICINE

## 2024-01-04 PROCEDURE — 36415 COLL VENOUS BLD VENIPUNCTURE: CPT | Performed by: FAMILY MEDICINE

## 2024-01-04 PROCEDURE — 80053 COMPREHEN METABOLIC PANEL: CPT | Performed by: FAMILY MEDICINE

## 2024-01-04 PROCEDURE — 99396 PREV VISIT EST AGE 40-64: CPT | Performed by: FAMILY MEDICINE

## 2024-01-04 PROCEDURE — 90480 ADMN SARSCOV2 VAC 1/ONLY CMP: CPT | Performed by: FAMILY MEDICINE

## 2024-01-04 PROCEDURE — G0145 SCR C/V CYTO,THINLAYER,RESCR: HCPCS | Performed by: FAMILY MEDICINE

## 2024-01-04 PROCEDURE — 85027 COMPLETE CBC AUTOMATED: CPT | Performed by: FAMILY MEDICINE

## 2024-01-04 PROCEDURE — 91320 SARSCV2 VAC 30MCG TRS-SUC IM: CPT | Performed by: FAMILY MEDICINE

## 2024-01-04 ASSESSMENT — ENCOUNTER SYMPTOMS
CHILLS: 0
JOINT SWELLING: 0
FEVER: 0
NAUSEA: 0
NERVOUS/ANXIOUS: 0
PALPITATIONS: 0
HEADACHES: 0
MYALGIAS: 0
CONSTIPATION: 0
SHORTNESS OF BREATH: 0
ABDOMINAL PAIN: 0
HEMATOCHEZIA: 0
FREQUENCY: 0
HEARTBURN: 0
DYSURIA: 0
COUGH: 0
DIZZINESS: 0
SORE THROAT: 0
EYE PAIN: 0
BREAST MASS: 0
HEMATURIA: 0
PARESTHESIAS: 0
DIARRHEA: 0
ARTHRALGIAS: 0
WEAKNESS: 0

## 2024-01-04 NOTE — PATIENT INSTRUCTIONS
The medication that we talked about for mood/hot flashes is called Effexor.      Preventive Health Recommendations  Female Ages 50 - 64    Yearly exam: See your health care provider every year in order to  Review health changes.   Discuss preventive care.    Review your medicines if your doctor has prescribed any.    Get a Pap test every three years (unless you have an abnormal result and your provider advises testing more often).  If you get Pap tests with HPV test, you only need to test every 5 years, unless you have an abnormal result.   You do not need a Pap test if your uterus was removed (hysterectomy) and you have not had cancer.  You should be tested each year for STDs (sexually transmitted diseases) if you're at risk.   Have a mammogram every 1 to 2 years.  Have a colonoscopy at age 45, or have a yearly FIT test (stool test). These exams screen for colon cancer.    Have a cholesterol test every 5 years, or more often if advised.  Have a diabetes test (fasting glucose) every three years. If you are at risk for diabetes, you should have this test more often.   If you are at risk for osteoporosis (brittle bone disease), think about having a bone density scan (DEXA).    Shots: Get a flu shot each year. Get a tetanus shot every 10 years.    Nutrition:   Eat at least 5 servings of fruits and vegetables each day.  Eat whole-grain bread, whole-wheat pasta and brown rice instead of white grains and rice.  Get adequate Calcium and Vitamin D.     Lifestyle  Exercise at least 150 minutes a week (30 minutes a day, 5 days a week). This will help you control your weight and prevent disease.  Limit alcohol to one drink per day.  No smoking.   Wear sunscreen to prevent skin cancer.   See your dentist every six months for an exam and cleaning.  See your eye doctor every 1 to 2 years.

## 2024-01-04 NOTE — PROGRESS NOTES
SUBJECTIVE:   Jennifer is a 53 year old, presenting for the following:  Physical        2024     6:55 AM   Additional Questions   Roomed by Nancy NGUYEN LPN       Healthy Habits:     Getting at least 3 servings of Calcium per day:  Yes    Bi-annual eye exam:  Yes    Dental care twice a year:  Yes    Sleep apnea or symptoms of sleep apnea:  None    Diet:  Regular (no restrictions)    Frequency of exercise:  4-5 days/week    Duration of exercise:  30-45 minutes    Taking medications regularly:  Yes    Medication side effects:  None    Additional concerns today:  Yes      PROBLEMS TO ADD ON...  Menopausal transition has been difficult.  Doesn't like weight gain around the midsection, more emotional, tearful.  Frequent hot flashes.      Social History     Tobacco Use    Smoking status: Former     Packs/day: 0.10     Years: 10.00     Additional pack years: 0.00     Total pack years: 1.00     Types: Cigarettes     Start date: 2007     Quit date: 2017     Years since quittin.7    Smokeless tobacco: Never   Substance Use Topics    Alcohol use: Yes     Alcohol/week: 4.0 standard drinks of alcohol     Types: 4 Standard drinks or equivalent per week           2023    10:52 AM   Alcohol Use   Prescreen: >3 drinks/day or >7 drinks/week? No     Reviewed orders with patient.  Reviewed health maintenance and updated orders accordingly - Yes    BP Readings from Last 3 Encounters:   24 104/66   23 102/63   22 117/56    Wt Readings from Last 3 Encounters:   24 61.2 kg (135 lb)   23 59.9 kg (132 lb)   22 57.2 kg (126 lb)                  Patient Active Problem List   Diagnosis    Psoriasis    Mild intermittent asthma without complication    Hot flashes    ASCUS of cervix with negative high risk HPV    Allergic rhinitis due to pollen    Itchy skin    Flexural atopic dermatitis    Flatulence, eructation and gas pain     Past Surgical History:   Procedure Laterality Date    MOUTH  SURGERY      dental-related and wisdom teeth (x4)       Social History     Tobacco Use    Smoking status: Former     Packs/day: 0.10     Years: 10.00     Additional pack years: 0.00     Total pack years: 1.00     Types: Cigarettes     Start date: 2007     Quit date: 2017     Years since quittin.7    Smokeless tobacco: Never   Substance Use Topics    Alcohol use: Yes     Alcohol/week: 4.0 standard drinks of alcohol     Types: 4 Standard drinks or equivalent per week     Family History   Adopted: Yes   Problem Relation Age of Onset    Breast Cancer Mother 61        genetic testing neg    Thyroid Disease Mother     Cancer Maternal Grandmother         HCC    Alcoholism Maternal Grandmother     Colon Cancer No family hx of     Diabetes No family hx of     Heart Disease No family hx of     Ovarian Cancer No family hx of          Current Outpatient Medications   Medication Sig Dispense Refill    albuterol (PROVENTIL HFA;VENTOLIN HFA) 90 mcg/actuation inhaler [ALBUTEROL (PROVENTIL HFA;VENTOLIN HFA) 90 MCG/ACTUATION INHALER] Inhale 2 puffs every 6 (six) hours as needed for wheezing.      clobetasol (TEMOVATE) 0.05 % GEL topical gel       flurandrenolide (CORDRAN) 0.05 % external lotion       loratadine (CLARITIN) 10 MG tablet Take 10 mg by mouth as needed       Allergies   Allergen Reactions    Other Environmental Allergy Other (See Comments)     Red eyes and sneezing., DUST       Breast Cancer Screening:    FHS-7:       2022     9:05 AM 2022    12:03 PM 3/30/2023     7:30 AM 2023     8:55 AM 2023    11:01 AM   Breast CA Risk Assessment (FHS-7)   Did any of your first-degree relatives have breast or ovarian cancer? Yes Yes No Yes Yes   Did any of your relatives have bilateral breast cancer? No No No No No   Did any man in your family have breast cancer? No No No No No   Did any woman in your family have breast and ovarian cancer? Yes No Yes No No   Did any woman in your family have breast  cancer before age 50 y? No No No No No   Do you have 2 or more relatives with breast and/or ovarian cancer? No No No No No   Do you have 2 or more relatives with breast and/or bowel cancer? No No No No No       Mammogram Screening: Recommended annual mammography  Pertinent mammograms are reviewed under the imaging tab.    History of abnormal Pap smear: YES - updated in Problem List and Health Maintenance accordingly      Latest Ref Rng & Units 2021     4:02 PM   PAP / HPV   PAP Negative for squamous intraepithelial lesion or malignancy. Atypical squamous cells of undetermined significance  Electronically signed by Pj Carr MD on 3/1/2021 at 10:16 AM      HPV 16 DNA NEG Negative    HPV 18 DNA NEG Negative    Other HR HPV NEG Negative      Reviewed and updated as needed this visit by clinical staff   Tobacco  Allergies  Meds              Reviewed and updated as needed this visit by Provider   Tobacco  Allergies  Meds   Med Hx  Surg Hx  Fam Hx  Soc Hx         Past Medical History:   Diagnosis Date    ASCUS of cervix with negative high risk HPV 2021 NIL pap, neg HPV 21 ASCUS, neg HPV. Plan: cotest in 3 years    Uncomplicated asthma       Past Surgical History:   Procedure Laterality Date    MOUTH SURGERY      dental-related and wisdom teeth (x4)     OB History    Para Term  AB Living   2 2 2 0 0 0   SAB IAB Ectopic Multiple Live Births   0 0 0 0 0      # Outcome Date GA Lbr [place]/2nd Weight Sex Delivery Anes PTL Lv   2 Term            1 Term                Review of Systems   Constitutional:  Negative for chills and fever.   HENT:  Negative for congestion, ear pain, hearing loss and sore throat.    Eyes:  Negative for pain and visual disturbance.   Respiratory:  Negative for cough and shortness of breath.    Cardiovascular:  Negative for chest pain, palpitations and peripheral edema.   Gastrointestinal:  Negative for abdominal pain, constipation, diarrhea,  "heartburn, hematochezia and nausea.   Breasts:  Negative for tenderness, breast mass and discharge.   Genitourinary:  Negative for dysuria, frequency, genital sores, hematuria, pelvic pain, urgency, vaginal bleeding and vaginal discharge.   Musculoskeletal:  Negative for arthralgias, joint swelling and myalgias.   Skin:  Negative for rash.   Neurological:  Negative for dizziness, weakness, headaches and paresthesias.   Psychiatric/Behavioral:  Negative for mood changes. The patient is not nervous/anxious.         OBJECTIVE:   /66   Pulse 62   Temp 97.6  F (36.4  C) (Oral)   Resp 16   Ht 1.6 m (5' 3\")   Wt 61.2 kg (135 lb)   LMP 09/08/2022 (Approximate)   SpO2 100%   BMI 23.91 kg/m    Physical Exam  GENERAL APPEARANCE: healthy, alert and no distress  EYES: Eyes grossly normal to inspection, PERRL and conjunctivae and sclerae normal  HENT: ear canals and TM's normal, nose and mouth without ulcers or lesions, oropharynx clear and oral mucous membranes moist  NECK: no adenopathy, no asymmetry, masses, or scars and thyroid normal to palpation  RESP: lungs clear to auscultation - no rales, rhonchi or wheezes  BREAST: normal without masses, tenderness or nipple discharge and no palpable axillary masses or adenopathy  CV: regular rate and rhythm, normal S1 S2, no S3 or S4, no murmur, click or rub, no peripheral edema and peripheral pulses strong  ABDOMEN: soft, nontender, no hepatosplenomegaly, no masses and bowel sounds normal   (female): normal female external genitalia, normal urethral meatus, vaginal mucosal atrophy noted, normal cervix, adnexae, and uterus without masses or abnormal discharge  MS: no musculoskeletal defects are noted and gait is age appropriate without ataxia  SKIN: no suspicious lesions or rashes  NEURO: Normal strength and tone, sensory exam grossly normal, mentation intact and speech normal  PSYCH: mentation appears normal and affect normal/bright    Diagnostic Test Results:  Labs " reviewed in Epic  Results for orders placed or performed in visit on 01/04/24   Lipid panel reflex to direct LDL Fasting     Status: Abnormal   Result Value Ref Range    Cholesterol 224 (H) <200 mg/dL    Triglycerides 56 <150 mg/dL    Direct Measure HDL 88 >=50 mg/dL    LDL Cholesterol Calculated 125 (H) <=100 mg/dL    Non HDL Cholesterol 136 (H) <130 mg/dL    Patient Fasting > 8hrs? Yes     Narrative    Cholesterol  Desirable:  <200 mg/dL    Triglycerides  Normal:  Less than 150 mg/dL  Borderline High:  150-199 mg/dL  High:  200-499 mg/dL  Very High:  Greater than or equal to 500 mg/dL    Direct Measure HDL  Female:  Greater than or equal to 50 mg/dL   Male:  Greater than or equal to 40 mg/dL    LDL Cholesterol  Desirable:  <100mg/dL  Above Desirable:  100-129 mg/dL   Borderline High:  130-159 mg/dL   High:  160-189 mg/dL   Very High:  >= 190 mg/dL    Non HDL Cholesterol  Desirable:  130 mg/dL  Above Desirable:  130-159 mg/dL  Borderline High:  160-189 mg/dL  High:  190-219 mg/dL  Very High:  Greater than or equal to 220 mg/dL   Comprehensive metabolic panel (BMP + Alb, Alk Phos, ALT, AST, Total. Bili, TP)     Status: Normal   Result Value Ref Range    Sodium 140 135 - 145 mmol/L    Potassium 4.5 3.4 - 5.3 mmol/L    Carbon Dioxide (CO2) 26 22 - 29 mmol/L    Anion Gap 10 7 - 15 mmol/L    Urea Nitrogen 12.3 6.0 - 20.0 mg/dL    Creatinine 0.92 0.51 - 0.95 mg/dL    GFR Estimate 75 >60 mL/min/1.73m2    Calcium 9.3 8.6 - 10.0 mg/dL    Chloride 104 98 - 107 mmol/L    Glucose 76 70 - 99 mg/dL    Alkaline Phosphatase 81 40 - 150 U/L    AST 35 0 - 45 U/L    ALT 42 0 - 50 U/L    Protein Total 7.2 6.4 - 8.3 g/dL    Albumin 4.2 3.5 - 5.2 g/dL    Bilirubin Total 0.6 <=1.2 mg/dL   CBC with platelets     Status: Normal   Result Value Ref Range    WBC Count 4.9 4.0 - 11.0 10e3/uL    RBC Count 4.24 3.80 - 5.20 10e6/uL    Hemoglobin 13.6 11.7 - 15.7 g/dL    Hematocrit 40.9 35.0 - 47.0 %    MCV 97 78 - 100 fL    MCH 32.1 26.5 - 33.0  pg    MCHC 33.3 31.5 - 36.5 g/dL    RDW 12.5 10.0 - 15.0 %    Platelet Count 183 150 - 450 10e3/uL       ASSESSMENT/PLAN:   1. Routine general medical examination at a health care facility  Routine history and physical, updated in EMR.  Fasting labs updated as below.  Immunizations updated today.  Pap smear updated also.  Mammogram is up to date and normal.  Colon cancer screening will be due in 2025.  Plan repeat physical in 1 year.  - Lipid panel reflex to direct LDL Fasting  - Comprehensive metabolic panel (BMP + Alb, Alk Phos, ALT, AST, Total. Bili, TP)  - CBC with platelets    2. Hot flashes  Discussed options for treatment including selective serotonin reuptake inhibitor or SNRI treatment which may help with mood also.  Patient will think about this and let me know what she decides.  - Comprehensive metabolic panel (BMP + Alb, Alk Phos, ALT, AST, Total. Bili, TP)  - CBC with platelets    3. Cervical cancer screening  4. ASCUS of cervix with negative high risk HPV  Screening pap smear updated today.  No new sexual partners,  and monogamous.  - Pap Screen with HPV - recommended age 30 - 65 years    5. Lipid screening  Fasting lipids mildly elevated, but ASCVD risk remains low.    - Lipid panel reflex to direct LDL Fasting    6. Subclinical hypothyroidism  Discussed the option of treatment versus close observation, and decided on the latter for now.  Plan recheck TSH and T4 in 6 months.        Patient has been advised of split billing requirements and indicates understanding: Yes      COUNSELING:  Reviewed preventive health counseling, as reflected in patient instructions  Special attention given to:        Regular exercise       Healthy diet/nutrition       Immunizations  Vaccinated for: Covid-19         Colorectal Cancer Screening       The 10-year ASCVD risk score (Vinod BACK, et al., 2019) is: 0.7%    Values used to calculate the score:      Age: 53 years      Sex: Female      Is Non-   American: No      Diabetic: No      Tobacco smoker: No      Systolic Blood Pressure: 104 mmHg      Is BP treated: No      HDL Cholesterol: 88 mg/dL      Total Cholesterol: 224 mg/dL        She reports that she quit smoking about 6 years ago. Her smoking use included cigarettes. She started smoking about 17 years ago. She has a 1 pack-year smoking history. She has never used smokeless tobacco.          Olivia Ascencio MD  [facility]

## 2024-01-05 PROBLEM — E03.8 SUBCLINICAL HYPOTHYROIDISM: Status: ACTIVE | Noted: 2024-01-05

## 2024-01-10 LAB
BKR LAB AP GYN ADEQUACY: NORMAL
BKR LAB AP GYN INTERPRETATION: NORMAL
BKR LAB AP HPV REFLEX: NORMAL
BKR LAB AP LMP: NORMAL
BKR LAB AP PREVIOUS ABNL DX: NORMAL
BKR LAB AP PREVIOUS ABNORMAL: NORMAL
PATH REPORT.COMMENTS IMP SPEC: NORMAL
PATH REPORT.COMMENTS IMP SPEC: NORMAL
PATH REPORT.RELEVANT HX SPEC: NORMAL

## 2024-01-11 LAB
HUMAN PAPILLOMA VIRUS 16 DNA: NEGATIVE
HUMAN PAPILLOMA VIRUS 18 DNA: NEGATIVE
HUMAN PAPILLOMA VIRUS FINAL DIAGNOSIS: NORMAL
HUMAN PAPILLOMA VIRUS OTHER HR: NEGATIVE

## 2024-01-12 PROBLEM — R87.610 ASCUS OF CERVIX WITH NEGATIVE HIGH RISK HPV: Status: ACTIVE | Noted: 2021-02-22

## 2024-01-28 ENCOUNTER — MYC MEDICAL ADVICE (OUTPATIENT)
Dept: FAMILY MEDICINE | Facility: CLINIC | Age: 54
End: 2024-01-28
Payer: COMMERCIAL

## 2024-01-28 DIAGNOSIS — R23.2 HOT FLASHES: Primary | ICD-10-CM

## 2024-01-31 RX ORDER — VENLAFAXINE HYDROCHLORIDE 37.5 MG/1
37.5 CAPSULE, EXTENDED RELEASE ORAL EVERY EVENING
Qty: 30 CAPSULE | Refills: 1 | Status: SHIPPED | OUTPATIENT
Start: 2024-01-31 | End: 2024-03-20

## 2024-03-20 DIAGNOSIS — R23.2 HOT FLASHES: ICD-10-CM

## 2024-03-20 RX ORDER — VENLAFAXINE HYDROCHLORIDE 37.5 MG/1
37.5 CAPSULE, EXTENDED RELEASE ORAL EVERY EVENING
Qty: 90 CAPSULE | Refills: 2 | Status: SHIPPED | OUTPATIENT
Start: 2024-03-20

## 2024-04-08 ENCOUNTER — TRANSFERRED RECORDS (OUTPATIENT)
Dept: HEALTH INFORMATION MANAGEMENT | Facility: CLINIC | Age: 54
End: 2024-04-08
Payer: COMMERCIAL

## 2024-07-06 DIAGNOSIS — E03.8 SUBCLINICAL HYPOTHYROIDISM: Primary | ICD-10-CM

## 2024-07-09 ENCOUNTER — LAB (OUTPATIENT)
Dept: LAB | Facility: CLINIC | Age: 54
End: 2024-07-09
Payer: COMMERCIAL

## 2024-07-09 DIAGNOSIS — E03.8 SUBCLINICAL HYPOTHYROIDISM: ICD-10-CM

## 2024-07-09 LAB
T4 FREE SERPL-MCNC: 1.03 NG/DL (ref 0.9–1.7)
TSH SERPL DL<=0.005 MIU/L-ACNC: 4.7 UIU/ML (ref 0.3–4.2)

## 2024-07-09 PROCEDURE — 36415 COLL VENOUS BLD VENIPUNCTURE: CPT

## 2024-07-09 PROCEDURE — 84439 ASSAY OF FREE THYROXINE: CPT

## 2024-07-09 PROCEDURE — 84443 ASSAY THYROID STIM HORMONE: CPT

## 2024-11-05 DIAGNOSIS — R23.2 HOT FLASHES: ICD-10-CM

## 2024-11-06 RX ORDER — VENLAFAXINE HYDROCHLORIDE 37.5 MG/1
37.5 CAPSULE, EXTENDED RELEASE ORAL EVERY EVENING
Qty: 90 CAPSULE | Refills: 2 | OUTPATIENT
Start: 2024-11-06

## 2024-12-16 ENCOUNTER — MYC REFILL (OUTPATIENT)
Dept: FAMILY MEDICINE | Facility: CLINIC | Age: 54
End: 2024-12-16
Payer: COMMERCIAL

## 2024-12-16 ENCOUNTER — TRANSFERRED RECORDS (OUTPATIENT)
Dept: HEALTH INFORMATION MANAGEMENT | Facility: CLINIC | Age: 54
End: 2024-12-16
Payer: COMMERCIAL

## 2024-12-16 DIAGNOSIS — R23.2 HOT FLASHES: ICD-10-CM

## 2024-12-17 ENCOUNTER — TRANSFERRED RECORDS (OUTPATIENT)
Dept: HEALTH INFORMATION MANAGEMENT | Facility: CLINIC | Age: 54
End: 2024-12-17
Payer: COMMERCIAL

## 2024-12-17 RX ORDER — VENLAFAXINE HYDROCHLORIDE 37.5 MG/1
37.5 CAPSULE, EXTENDED RELEASE ORAL EVERY EVENING
Qty: 30 CAPSULE | Refills: 0 | Status: SHIPPED | OUTPATIENT
Start: 2024-12-17

## 2024-12-17 ASSESSMENT — ASTHMA QUESTIONNAIRES
ACT_TOTALSCORE: 21
QUESTION_3 LAST FOUR WEEKS HOW OFTEN DID YOUR ASTHMA SYMPTOMS (WHEEZING, COUGHING, SHORTNESS OF BREATH, CHEST TIGHTNESS OR PAIN) WAKE YOU UP AT NIGHT OR EARLIER THAN USUAL IN THE MORNING: ONCE OR TWICE
QUESTION_4 LAST FOUR WEEKS HOW OFTEN HAVE YOU USED YOUR RESCUE INHALER OR NEBULIZER MEDICATION (SUCH AS ALBUTEROL): ONCE A WEEK OR LESS
ACT_TOTALSCORE: 21
QUESTION_5 LAST FOUR WEEKS HOW WOULD YOU RATE YOUR ASTHMA CONTROL: WELL CONTROLLED
QUESTION_1 LAST FOUR WEEKS HOW MUCH OF THE TIME DID YOUR ASTHMA KEEP YOU FROM GETTING AS MUCH DONE AT WORK, SCHOOL OR AT HOME: NONE OF THE TIME
QUESTION_2 LAST FOUR WEEKS HOW OFTEN HAVE YOU HAD SHORTNESS OF BREATH: ONCE OR TWICE A WEEK

## 2024-12-18 ENCOUNTER — OFFICE VISIT (OUTPATIENT)
Dept: FAMILY MEDICINE | Facility: CLINIC | Age: 54
End: 2024-12-18
Payer: COMMERCIAL

## 2024-12-18 VITALS
DIASTOLIC BLOOD PRESSURE: 60 MMHG | WEIGHT: 132.7 LBS | OXYGEN SATURATION: 100 % | TEMPERATURE: 98.6 F | HEART RATE: 75 BPM | BODY MASS INDEX: 23.51 KG/M2 | SYSTOLIC BLOOD PRESSURE: 109 MMHG | RESPIRATION RATE: 16 BRPM | HEIGHT: 63 IN

## 2024-12-18 DIAGNOSIS — S82.002A CLOSED DISPLACED FRACTURE OF LEFT PATELLA, UNSPECIFIED FRACTURE MORPHOLOGY, INITIAL ENCOUNTER: ICD-10-CM

## 2024-12-18 DIAGNOSIS — Z01.818 PREOPERATIVE EXAMINATION: Primary | ICD-10-CM

## 2024-12-18 LAB
ERYTHROCYTE [DISTWIDTH] IN BLOOD BY AUTOMATED COUNT: 12.5 % (ref 10–15)
HCT VFR BLD AUTO: 39.8 % (ref 35–47)
HGB BLD-MCNC: 13.4 G/DL (ref 11.7–15.7)
MCH RBC QN AUTO: 31.8 PG (ref 26.5–33)
MCHC RBC AUTO-ENTMCNC: 33.7 G/DL (ref 31.5–36.5)
MCV RBC AUTO: 95 FL (ref 78–100)
PLATELET # BLD AUTO: 178 10E3/UL (ref 150–450)
RBC # BLD AUTO: 4.21 10E6/UL (ref 3.8–5.2)
WBC # BLD AUTO: 8.9 10E3/UL (ref 4–11)

## 2024-12-18 PROCEDURE — 85027 COMPLETE CBC AUTOMATED: CPT | Performed by: FAMILY MEDICINE

## 2024-12-18 PROCEDURE — 36415 COLL VENOUS BLD VENIPUNCTURE: CPT | Performed by: FAMILY MEDICINE

## 2024-12-18 PROCEDURE — 99214 OFFICE O/P EST MOD 30 MIN: CPT | Performed by: FAMILY MEDICINE

## 2024-12-18 NOTE — PROGRESS NOTES
Preoperative Evaluation  [address] [facility] [address]  Phone: [phone]  Primary Provider: Olivia Ascencio MD  Pre-op Performing Provider: Will Lincoln DO  Dec 18, 2024         12/17/2024   Surgical Information   What procedure is being done? Repair left knee patella fracture    Facility or Hospital where procedure/surgery will be performed: [facility]    Who is doing the procedure / surgery? Dr. Lopez    Date of surgery / procedure: 12/23/2024    Time of surgery / procedure: 2:45pm    Where do you plan to recover after surgery? at home with family        Patient-reported     Fax number for surgical facility: [phone]    Assessment & Plan     The proposed surgical procedure is considered INTERMEDIATE risk.    Preoperative examination  - REVIEW OF HEALTH MAINTENANCE PROTOCOL ORDERS  - CBC with platelets; Future  - CBC with platelets    Closed displaced fracture of left patella, unspecified fracture morphology, initial encounter            - No identified additional risk factors other than previously addressed    Antiplatelet or Anticoagulation Medication Instructions   - Patient is on no antiplatelet or anticoagulation medications.    Additional Medication Instructions  Take all scheduled medications on the day of surgery    Recommendation  Approval given to proceed with proposed procedure, without further diagnostic evaluation.    Ashley Rodriguez is a 54 year old, presenting for the following:  Pre-Op Exam          12/18/2024    10:07 AM   Additional Questions   Roomed by ailyn SAMANIEGO related to upcoming procedure: Knee surgery        12/17/2024   Pre-Op Questionnaire   Have you ever had a heart attack or stroke? No    Have you ever had surgery on your heart or blood vessels, such as a stent placement, a coronary artery bypass, or surgery on an artery in your head, neck, heart, or legs? No    Do you have chest  pain with activity? No    Do you have a history of heart failure? No    Do you currently have a cold, bronchitis or symptoms of other infection? No    Do you have a cough, shortness of breath, or wheezing? No    Do you or anyone in your family have previous history of blood clots? No    Do you or does anyone in your family have a serious bleeding problem such as prolonged bleeding following surgeries or cuts? No    Have you ever had problems with anemia or been told to take iron pills? No    Have you had any abnormal blood loss such as black, tarry or bloody stools, or abnormal vaginal bleeding? No    Have you ever had a blood transfusion? No    Are you willing to have a blood transfusion if it is medically needed before, during, or after your surgery? Yes    Have you or any of your relatives ever had problems with anesthesia? No    Do you have sleep apnea, excessive snoring or daytime drowsiness? No    Do you have any artifical heart valves or other implanted medical devices like a pacemaker, defibrillator, or continuous glucose monitor? No    Do you have artificial joints? No    Are you allergic to latex? No        Patient-reported     Health Care Directive  Patient does not have a Health Care Directive    Preoperative Review of    reviewed - controlled substances reflected in medication list.      Status of Chronic Conditions:  See problem list for active medical problems.  Problems all longstanding and stable, except as noted/documented.  See ROS for pertinent symptoms related to these conditions.    Patient Active Problem List    Diagnosis Date Noted    Subclinical hypothyroidism 01/05/2024     Priority: Medium    Itchy skin 01/25/2022     Priority: Medium    Flexural atopic dermatitis 01/25/2022     Priority: Medium    Flatulence, eructation and gas pain 01/25/2022     Priority: Medium    ASCUS of cervix with negative high risk HPV 02/22/2021     Priority: Medium     Formatting of this note might be  different from the original.  16 NIL pap, neg HPV  21 ASCUS, neg HPV. Plan: cotest in 3 years  24 NIL pap, Neg HPV. Plan: cotest in 5 years      Hot flashes 2018     Priority: Medium    Psoriasis 2016     Priority: Medium     Formatting of this note might be different from the original.  Has seen Dr. Mullins      Mild intermittent asthma without complication 2016     Priority: Medium     Formatting of this note might be different from the original.  Overview:   Allergy and exercise induced  Formatting of this note might be different from the original.  Allergy and exercise induced      Allergic rhinitis due to pollen 2015     Priority: Medium      Past Medical History:   Diagnosis Date    ASCUS of cervix with negative high risk HPV 2021 NIL pap, neg HPV 21 ASCUS, neg HPV. Plan: cotest in 3 years    Uncomplicated asthma      Past Surgical History:   Procedure Laterality Date    MOUTH SURGERY      dental-related and wisdom teeth (x4)     Current Outpatient Medications   Medication Sig Dispense Refill    albuterol (PROVENTIL HFA;VENTOLIN HFA) 90 mcg/actuation inhaler [ALBUTEROL (PROVENTIL HFA;VENTOLIN HFA) 90 MCG/ACTUATION INHALER] Inhale 2 puffs every 6 (six) hours as needed for wheezing.      clobetasol (TEMOVATE) 0.05 % GEL topical gel       flurandrenolide (CORDRAN) 0.05 % external lotion       loratadine (CLARITIN) 10 MG tablet Take 10 mg by mouth as needed      venlafaxine (EFFEXOR XR) 37.5 MG 24 hr capsule Take 1 capsule (37.5 mg) by mouth every evening. 30 capsule 0       Allergies   Allergen Reactions    Other Environmental Allergy Other (See Comments)     Red eyes and sneezing., DUST        Social History     Tobacco Use    Smoking status: Former     Current packs/day: 0.00     Average packs/day: 0.1 packs/day for 10.2 years (1.0 ttl pk-yrs)     Types: Cigarettes     Start date: 2007     Quit date: 2017     Years since quittin.7     "Smokeless tobacco: Never   Substance Use Topics    Alcohol use: Yes     Alcohol/week: 4.0 standard drinks of alcohol     Types: 4 Standard drinks or equivalent per week     Family History   Adopted: Yes   Problem Relation Age of Onset    Breast Cancer Mother 61        genetic testing neg    Thyroid Disease Mother     Cancer Maternal Grandmother         HCC    Alcoholism Maternal Grandmother     Colon Cancer No family hx of     Diabetes No family hx of     Heart Disease No family hx of     Ovarian Cancer No family hx of      History   Drug Use No             Review of Systems  Constitutional, HEENT, cardiovascular, pulmonary, gi and gu systems are negative, except as otherwise noted.    Objective    /60   Pulse 75   Temp 98.6  F (37  C) (Temporal)   Resp 16   Ht 1.6 m (5' 3\")   Wt 60.2 kg (132 lb 11.2 oz)   LMP 09/08/2022 (Approximate)   SpO2 100%   BMI 23.51 kg/m     Estimated body mass index is 23.51 kg/m  as calculated from the following:    Height as of this encounter: 1.6 m (5' 3\").    Weight as of this encounter: 60.2 kg (132 lb 11.2 oz).    Physical Exam  GENERAL: alert and no distress  EYES: Eyes grossly normal to inspection  HENT: ear canals and TM's normal, nose and mouth without ulcers or lesions  NECK: no adenopathy, no asymmetry, masses, or scars  RESP: lungs clear to auscultation - no rales, rhonchi or wheezes  CV: regular rates and rhythm, normal S1 S2, no S3 or S4, and no murmur, click or rub  PSYCH: mentation appears normal, affect normal/bright    Recent Labs   Lab Test 01/04/24  0753   HGB 13.6         POTASSIUM 4.5   CR 0.92        Diagnostics  No labs were ordered during this visit.     No EKG required, no history of coronary heart disease, significant arrhythmia, peripheral arterial disease or other structural heart disease.    Revised Cardiac Risk Index (RCRI)  The patient has the following serious cardiovascular risks for perioperative complications:   - No serious " cardiac risks = 0 points     RCRI Interpretation: 0 points: Class I (very low risk - 0.4% complication rate)         Signed Electronically by: Will Lincoln DO  A copy of this evaluation report is provided to the requesting physician.

## 2025-01-06 ENCOUNTER — TRANSFERRED RECORDS (OUTPATIENT)
Dept: HEALTH INFORMATION MANAGEMENT | Facility: CLINIC | Age: 55
End: 2025-01-06
Payer: COMMERCIAL

## 2025-01-08 ENCOUNTER — PATIENT OUTREACH (OUTPATIENT)
Dept: CARE COORDINATION | Facility: CLINIC | Age: 55
End: 2025-01-08
Payer: COMMERCIAL

## 2025-01-08 DIAGNOSIS — Z12.11 COLON CANCER SCREENING: ICD-10-CM

## 2025-01-08 DIAGNOSIS — R23.2 HOT FLASHES: ICD-10-CM

## 2025-01-08 RX ORDER — VENLAFAXINE HYDROCHLORIDE 37.5 MG/1
37.5 CAPSULE, EXTENDED RELEASE ORAL EVERY EVENING
Qty: 90 CAPSULE | Refills: 1 | Status: SHIPPED | OUTPATIENT
Start: 2025-01-08

## 2025-01-11 SDOH — HEALTH STABILITY: PHYSICAL HEALTH: ON AVERAGE, HOW MANY MINUTES DO YOU ENGAGE IN EXERCISE AT THIS LEVEL?: 30 MIN

## 2025-01-11 SDOH — HEALTH STABILITY: PHYSICAL HEALTH: ON AVERAGE, HOW MANY DAYS PER WEEK DO YOU ENGAGE IN MODERATE TO STRENUOUS EXERCISE (LIKE A BRISK WALK)?: 7 DAYS

## 2025-01-11 ASSESSMENT — SOCIAL DETERMINANTS OF HEALTH (SDOH): HOW OFTEN DO YOU GET TOGETHER WITH FRIENDS OR RELATIVES?: TWICE A WEEK

## 2025-01-11 ASSESSMENT — ASTHMA QUESTIONNAIRES
QUESTION_3 LAST FOUR WEEKS HOW OFTEN DID YOUR ASTHMA SYMPTOMS (WHEEZING, COUGHING, SHORTNESS OF BREATH, CHEST TIGHTNESS OR PAIN) WAKE YOU UP AT NIGHT OR EARLIER THAN USUAL IN THE MORNING: NOT AT ALL
QUESTION_4 LAST FOUR WEEKS HOW OFTEN HAVE YOU USED YOUR RESCUE INHALER OR NEBULIZER MEDICATION (SUCH AS ALBUTEROL): ONCE A WEEK OR LESS
ACT_TOTALSCORE: 24
QUESTION_2 LAST FOUR WEEKS HOW OFTEN HAVE YOU HAD SHORTNESS OF BREATH: NOT AT ALL
QUESTION_5 LAST FOUR WEEKS HOW WOULD YOU RATE YOUR ASTHMA CONTROL: COMPLETELY CONTROLLED
ACT_TOTALSCORE: 24
QUESTION_1 LAST FOUR WEEKS HOW MUCH OF THE TIME DID YOUR ASTHMA KEEP YOU FROM GETTING AS MUCH DONE AT WORK, SCHOOL OR AT HOME: NONE OF THE TIME

## 2025-01-16 ENCOUNTER — OFFICE VISIT (OUTPATIENT)
Dept: FAMILY MEDICINE | Facility: CLINIC | Age: 55
End: 2025-01-16
Attending: FAMILY MEDICINE
Payer: COMMERCIAL

## 2025-01-16 VITALS
WEIGHT: 135.2 LBS | HEART RATE: 59 BPM | DIASTOLIC BLOOD PRESSURE: 70 MMHG | SYSTOLIC BLOOD PRESSURE: 111 MMHG | RESPIRATION RATE: 16 BRPM | HEIGHT: 63 IN | BODY MASS INDEX: 23.96 KG/M2 | OXYGEN SATURATION: 99 % | TEMPERATURE: 98.3 F

## 2025-01-16 DIAGNOSIS — R23.2 HOT FLASHES: ICD-10-CM

## 2025-01-16 DIAGNOSIS — Z00.00 ROUTINE GENERAL MEDICAL EXAMINATION AT A HEALTH CARE FACILITY: Primary | ICD-10-CM

## 2025-01-16 DIAGNOSIS — E03.8 SUBCLINICAL HYPOTHYROIDISM: ICD-10-CM

## 2025-01-16 DIAGNOSIS — N95.1 VAGINAL DRYNESS, MENOPAUSAL: ICD-10-CM

## 2025-01-16 DIAGNOSIS — Z13.220 LIPID SCREENING: ICD-10-CM

## 2025-01-16 DIAGNOSIS — Z12.31 VISIT FOR SCREENING MAMMOGRAM: ICD-10-CM

## 2025-01-16 DIAGNOSIS — R74.01 ELEVATED TRANSAMINASE LEVEL: ICD-10-CM

## 2025-01-16 PROCEDURE — 80061 LIPID PANEL: CPT | Performed by: FAMILY MEDICINE

## 2025-01-16 PROCEDURE — 80053 COMPREHEN METABOLIC PANEL: CPT | Performed by: FAMILY MEDICINE

## 2025-01-16 PROCEDURE — 99214 OFFICE O/P EST MOD 30 MIN: CPT | Mod: 25 | Performed by: FAMILY MEDICINE

## 2025-01-16 PROCEDURE — 99396 PREV VISIT EST AGE 40-64: CPT | Performed by: FAMILY MEDICINE

## 2025-01-16 PROCEDURE — G2211 COMPLEX E/M VISIT ADD ON: HCPCS | Performed by: FAMILY MEDICINE

## 2025-01-16 PROCEDURE — 84443 ASSAY THYROID STIM HORMONE: CPT | Performed by: FAMILY MEDICINE

## 2025-01-16 PROCEDURE — 36415 COLL VENOUS BLD VENIPUNCTURE: CPT | Performed by: FAMILY MEDICINE

## 2025-01-16 PROCEDURE — 82977 ASSAY OF GGT: CPT | Performed by: FAMILY MEDICINE

## 2025-01-16 RX ORDER — ESTRADIOL 0.1 MG/G
CREAM VAGINAL
Qty: 42.5 G | Refills: 3 | Status: SHIPPED | OUTPATIENT
Start: 2025-01-16

## 2025-01-16 NOTE — PATIENT INSTRUCTIONS
Patient Education   Preventive Care Advice   This is general advice given by our system to help you stay healthy. However, your care team may have specific advice just for you. Please talk to your care team about your preventive care needs.  Nutrition  Eat 5 or more servings of fruits and vegetables each day.  Try wheat bread, brown rice and whole grain pasta (instead of white bread, rice, and pasta).  Get enough calcium and vitamin D. Check the label on foods and aim for 100% of the RDA (recommended daily allowance).  Lifestyle  Exercise at least 150 minutes each week  (30 minutes a day, 5 days a week).  Do muscle strengthening activities 2 days a week. These help control your weight and prevent disease.  No smoking.  Wear sunscreen to prevent skin cancer.  Have a dental exam and cleaning every 6 months.  Yearly exams  See your health care team every year to talk about:  Any changes in your health.  Any medicines your care team has prescribed.  Preventive care, family planning, and ways to prevent chronic diseases.  Shots (vaccines)   HPV shots (up to age 26), if you've never had them before.  Hepatitis B shots (up to age 59), if you've never had them before.  COVID-19 shot: Get this shot when it's due.  Flu shot: Get a flu shot every year.  Tetanus shot: Get a tetanus shot every 10 years.  Pneumococcal, hepatitis A, and RSV shots: Ask your care team if you need these based on your risk.  Shingles shot (for age 50 and up)  General health tests  Diabetes screening:  Starting at age 35, Get screened for diabetes at least every 3 years.  If you are younger than age 35, ask your care team if you should be screened for diabetes.  Cholesterol test: At age 39, start having a cholesterol test every 5 years, or more often if advised.  Bone density scan (DEXA): At age 50, ask your care team if you should have this scan for osteoporosis (brittle bones).  Hepatitis C: Get tested at least once in your life.  STIs (sexually  transmitted infections)  Before age 24: Ask your care team if you should be screened for STIs.  After age 24: Get screened for STIs if you're at risk. You are at risk for STIs (including HIV) if:  You are sexually active with more than one person.  You don't use condoms every time.  You or a partner was diagnosed with a sexually transmitted infection.  If you are at risk for HIV, ask about PrEP medicine to prevent HIV.  Get tested for HIV at least once in your life, whether you are at risk for HIV or not.  Cancer screening tests  Cervical cancer screening: If you have a cervix, begin getting regular cervical cancer screening tests starting at age 21.  Breast cancer scan (mammogram): If you've ever had breasts, begin having regular mammograms starting at age 40. This is a scan to check for breast cancer.  Colon cancer screening: It is important to start screening for colon cancer at age 45.  Have a colonoscopy test every 10 years (or more often if you're at risk) Or, ask your provider about stool tests like a FIT test every year or Cologuard test every 3 years.  To learn more about your testing options, visit:   .  For help making a decision, visit:   https://bit.ly/pr14165.  Prostate cancer screening test: If you have a prostate, ask your care team if a prostate cancer screening test (PSA) at age 55 is right for you.  Lung cancer screening: If you are a current or former smoker ages 50 to 80, ask your care team if ongoing lung cancer screenings are right for you.  For informational purposes only. Not to replace the advice of your health care provider. Copyright   2023 [employer] [facility]. All rights reserved. Clinically reviewed by the [facility] Transitions Program. [employer] 373106 - REV 01/24.

## 2025-01-16 NOTE — PROGRESS NOTES
Preventive Care Visit  [facility]  lOivia Ascencio MD, Family Medicine  Jan 16, 2025      Assessment & Plan     Routine general medical examination at a health care facility  Routine history and physical, updated in EMR.  Patient is essentially fasting, labs updated as below.  Mammogram discussed and ordered.  Pap smear is up to date, as is colon cancer screening.  Patient declines immunizations today.  Plan repeat physical in 1 year.    Vaginal dryness, menopausal  Discussed trial of topical estrogen and patient would like to try this.  - estradiol (ESTRACE) 0.1 MG/GM vaginal cream; Place 2 g intravaginally daily for 2 weeks, then 1 g intravaginally twice weekly thereafter  - Comprehensive metabolic panel (BMP + Alb, Alk Phos, ALT, AST, Total. Bili, TP); Future    Hot flashes  Doing well on current dose of venlafaxine.  Labs updated as below and show elevated transaminases.  She does not desire any changes in dose.  - Comprehensive metabolic panel (BMP + Alb, Alk Phos, ALT, AST, Total. Bili, TP); Future    Subclinical hypothyroidism  Thyroid hormone level is normal.  - TSH with free T4 reflex; Future    Visit for screening mammogram  Mammogram discussed and ordered.  - MA Screen Bilateral w/Jerzy; Future    Lipid screening  Lipids remain mildly elevated, but overall ASCVD risk remains low.  Recommended continued efforts at healthy diet and regular exercise.  - Lipid panel reflex to direct LDL Fasting; Future    Elevated transaminases  Possibly from Tylenol use during current orthopedic issue.  Plan recheck when using less acetaminophen and if still elevated, will pursue RUQ ultrasound.  Patient denies any RUQ pain or pain after eating.            Counseling  Appropriate preventive services were addressed with this patient via screening, questionnaire, or discussion as appropriate for fall prevention, nutrition, physical activity, Tobacco-use cessation, social engagement, weight loss and  cognition.  Checklist reviewing preventive services available has been given to the patient.  Reviewed patient's diet, addressing concerns and/or questions.           Subjective   Jennifer is a 54 year old, presenting for the following:  Physical        1/16/2025    12:04 PM   Additional Questions   Roomed by Nancy NGUYEN LPN          HPI    Had a patella fracture and underwent surgery.  Recovering pretty well, currently in an extension brace.  Having some pain around the urethra during intercourse.  Significantly bothersome.  No excess discharge, itching or burning.  Feeling well in terms of mood and hot flashes on current dose of venlafaxine.        Health Care Directive  Patient does not have a Health Care Directive: Discussed advance care planning with patient; information given to patient to review.      1/11/2025   General Health   How would you rate your overall physical health? Good   Feel stress (tense, anxious, or unable to sleep) Not at all         1/11/2025   Nutrition   Three or more servings of calcium each day? Yes   Diet: Regular (no restrictions)   How many servings of fruit and vegetables per day? 4 or more   How many sweetened beverages each day? 0-1         1/11/2025   Exercise   Days per week of moderate/strenous exercise 7 days   Average minutes spent exercising at this level 30 min         1/11/2025   Social Factors   Frequency of gathering with friends or relatives Twice a week   Worry food won't last until get money to buy more No   Food not last or not have enough money for food? No   Do you have housing? (Housing is defined as stable permanent housing and does not include staying ouside in a car, in a tent, in an abandoned building, in an overnight shelter, or couch-surfing.) Yes   Are you worried about losing your housing? No   Lack of transportation? No   Unable to get utilities (heat,electricity)? No         1/11/2025   Fall Risk   Fallen 2 or more times in the past year? No   Trouble with  walking or balance? No          2025   Dental   Dentist two times every year? Yes         2025   TB Screening   Were you born outside of the [place]? No         Today's PHQ-2 Score:       2025    11:57 AM   PHQ-2 (  [employer])   PHQ-2 Score Incomplete           2025   Substance Use   Alcohol more than 3/day or more than 7/wk No   Do you use any other substances recreationally? No     Social History     Tobacco Use    Smoking status: Former     Current packs/day: 0.00     Average packs/day: 0.1 packs/day for 10.2 years (1.0 ttl pk-yrs)     Types: Cigarettes     Start date: 2007     Quit date: 2017     Years since quittin.8    Smokeless tobacco: Never   Vaping Use    Vaping status: Never Used   Substance Use Topics    Alcohol use: Yes     Alcohol/week: 4.0 standard drinks of alcohol     Types: 4 Standard drinks or equivalent per week    Drug use: No           2023   LAST FHS-7 RESULTS   1st degree relative breast or ovarian cancer Yes   Any relative bilateral breast cancer No   Any male have breast cancer No   Any ONE woman have BOTH breast AND ovarian cancer Yes   Any woman with breast cancer before 50yrs No   2 or more relatives with breast AND/OR ovarian cancer No   2 or more relatives with breast AND/OR bowel cancer No                 2025   One time HIV Screening   Previous HIV test? Yes         2025   STI Screening   New sexual partner(s) since last STI/HIV test? No     History of abnormal Pap smear: No - age 30- 64 PAP with HPV every 5 years recommended        Latest Ref Rng & Units 2024     7:43 AM 2021     4:02 PM   PAP / HPV   PAP  Negative for Intraepithelial Lesion or Malignancy (NILM)  Atypical squamous cells of undetermined significance  Electronically signed by Pj Carr MD on 3/1/2021 at 10:16 AM      HPV 16 DNA Negative Negative  Negative    HPV 18 DNA Negative Negative  Negative    Other HR HPV Negative Negative  Negative      ASCVD Risk  "  The 10-year ASCVD risk score (Vinod BACK, et al., 2019) is: 0.9%    Values used to calculate the score:      Age: 54 years      Sex: Female      Is Non- : No      Diabetic: No      Tobacco smoker: No      Systolic Blood Pressure: 111 mmHg      Is BP treated: No      HDL Cholesterol: 88 mg/dL      Total Cholesterol: 224 mg/dL           Reviewed and updated as needed this visit by Provider                             Objective    Exam  /70   Pulse 59   Temp 98.3  F (36.8  C) (Oral)   Resp 16   Ht 1.6 m (5' 3\")   Wt 61.3 kg (135 lb 3.2 oz)   LMP 09/08/2022 (Approximate)   SpO2 99%   BMI 23.95 kg/m     Estimated body mass index is 23.95 kg/m  as calculated from the following:    Height as of this encounter: 1.6 m (5' 3\").    Weight as of this encounter: 61.3 kg (135 lb 3.2 oz).    Physical Exam          Signed Electronically by: Olivia Ascencio MD    "

## 2025-01-17 ENCOUNTER — MYC MEDICAL ADVICE (OUTPATIENT)
Dept: FAMILY MEDICINE | Facility: CLINIC | Age: 55
End: 2025-01-17
Payer: COMMERCIAL

## 2025-01-17 DIAGNOSIS — R74.01 ELEVATED TRANSAMINASE LEVEL: Primary | ICD-10-CM

## 2025-01-17 LAB
ALBUMIN SERPL BCG-MCNC: 4.7 G/DL (ref 3.5–5.2)
ALP SERPL-CCNC: 201 U/L (ref 40–150)
ALT SERPL W P-5'-P-CCNC: 62 U/L (ref 0–50)
ANION GAP SERPL CALCULATED.3IONS-SCNC: 10 MMOL/L (ref 7–15)
AST SERPL W P-5'-P-CCNC: 37 U/L (ref 0–45)
BILIRUB SERPL-MCNC: 0.5 MG/DL
BUN SERPL-MCNC: 10.7 MG/DL (ref 6–20)
CALCIUM SERPL-MCNC: 10.1 MG/DL (ref 8.8–10.4)
CHLORIDE SERPL-SCNC: 101 MMOL/L (ref 98–107)
CHOLEST SERPL-MCNC: 221 MG/DL
CREAT SERPL-MCNC: 0.79 MG/DL (ref 0.51–0.95)
EGFRCR SERPLBLD CKD-EPI 2021: 89 ML/MIN/1.73M2
FASTING STATUS PATIENT QL REPORTED: YES
FASTING STATUS PATIENT QL REPORTED: YES
GGT SERPL-CCNC: 450 U/L (ref 5–36)
GLUCOSE SERPL-MCNC: 78 MG/DL (ref 70–99)
HCO3 SERPL-SCNC: 28 MMOL/L (ref 22–29)
HDLC SERPL-MCNC: 83 MG/DL
LDLC SERPL CALC-MCNC: 125 MG/DL
NONHDLC SERPL-MCNC: 138 MG/DL
POTASSIUM SERPL-SCNC: 4.2 MMOL/L (ref 3.4–5.3)
PROT SERPL-MCNC: 8 G/DL (ref 6.4–8.3)
SODIUM SERPL-SCNC: 139 MMOL/L (ref 135–145)
TRIGL SERPL-MCNC: 66 MG/DL
TSH SERPL DL<=0.005 MIU/L-ACNC: 3.88 UIU/ML (ref 0.3–4.2)

## 2025-01-19 PROBLEM — R74.01 ELEVATED TRANSAMINASE LEVEL: Status: ACTIVE | Noted: 2025-01-19

## 2025-01-22 ENCOUNTER — ANCILLARY PROCEDURE (OUTPATIENT)
Dept: MAMMOGRAPHY | Facility: HOSPITAL | Age: 55
End: 2025-01-22
Attending: FAMILY MEDICINE
Payer: COMMERCIAL

## 2025-01-22 ENCOUNTER — ORDERS ONLY (AUTO-RELEASED) (OUTPATIENT)
Dept: ADMISSION | Facility: CLINIC | Age: 55
End: 2025-01-22

## 2025-01-22 DIAGNOSIS — Z12.31 VISIT FOR SCREENING MAMMOGRAM: ICD-10-CM

## 2025-01-22 DIAGNOSIS — Z12.11 COLON CANCER SCREENING: ICD-10-CM

## 2025-01-22 PROCEDURE — 77063 BREAST TOMOSYNTHESIS BI: CPT

## 2025-01-27 ENCOUNTER — LAB (OUTPATIENT)
Dept: LAB | Facility: CLINIC | Age: 55
End: 2025-01-27
Payer: COMMERCIAL

## 2025-01-27 DIAGNOSIS — Z11.59 NEED FOR HEPATITIS C SCREENING TEST: ICD-10-CM

## 2025-01-27 DIAGNOSIS — Z11.4 SCREENING FOR HIV (HUMAN IMMUNODEFICIENCY VIRUS): ICD-10-CM

## 2025-01-27 DIAGNOSIS — R74.01 ELEVATED TRANSAMINASE LEVEL: ICD-10-CM

## 2025-01-27 LAB
ALBUMIN SERPL BCG-MCNC: 4.5 G/DL (ref 3.5–5.2)
ALP SERPL-CCNC: 172 U/L (ref 40–150)
ALT SERPL W P-5'-P-CCNC: 59 U/L (ref 0–50)
AST SERPL W P-5'-P-CCNC: 40 U/L (ref 0–45)
BILIRUB DIRECT SERPL-MCNC: <0.2 MG/DL (ref 0–0.3)
BILIRUB SERPL-MCNC: 0.4 MG/DL
HCV AB SERPL QL IA: NONREACTIVE
HIV 1+2 AB+HIV1 P24 AG SERPL QL IA: NONREACTIVE
PROT SERPL-MCNC: 7.5 G/DL (ref 6.4–8.3)

## 2025-01-27 PROCEDURE — 80076 HEPATIC FUNCTION PANEL: CPT

## 2025-01-27 PROCEDURE — 36415 COLL VENOUS BLD VENIPUNCTURE: CPT

## 2025-01-27 PROCEDURE — 87389 HIV-1 AG W/HIV-1&-2 AB AG IA: CPT

## 2025-01-27 PROCEDURE — 86803 HEPATITIS C AB TEST: CPT

## 2025-01-28 DIAGNOSIS — R74.01 ELEVATED TRANSAMINASE LEVEL: Primary | ICD-10-CM

## 2025-01-29 ENCOUNTER — HOSPITAL ENCOUNTER (OUTPATIENT)
Dept: ULTRASOUND IMAGING | Facility: HOSPITAL | Age: 55
Discharge: HOME | End: 2025-01-29
Attending: FAMILY MEDICINE
Payer: COMMERCIAL

## 2025-01-29 DIAGNOSIS — R74.01 ELEVATED TRANSAMINASE LEVEL: ICD-10-CM

## 2025-01-29 PROCEDURE — 76705 ECHO EXAM OF ABDOMEN: CPT

## 2025-01-30 DIAGNOSIS — R74.01 ELEVATED TRANSAMINASE LEVEL: Primary | ICD-10-CM

## 2025-02-03 ENCOUNTER — TRANSFERRED RECORDS (OUTPATIENT)
Dept: HEALTH INFORMATION MANAGEMENT | Facility: CLINIC | Age: 55
End: 2025-02-03
Payer: COMMERCIAL

## 2025-02-09 LAB — NONINV COLON CA DNA+OCC BLD SCRN STL QL: NEGATIVE

## 2025-03-03 ENCOUNTER — LAB (OUTPATIENT)
Dept: LAB | Facility: CLINIC | Age: 55
End: 2025-03-03
Payer: COMMERCIAL

## 2025-03-03 DIAGNOSIS — R74.01 ELEVATED TRANSAMINASE LEVEL: ICD-10-CM

## 2025-03-03 LAB
ALBUMIN SERPL BCG-MCNC: 4.4 G/DL (ref 3.5–5.2)
ALP SERPL-CCNC: 122 U/L (ref 40–150)
ALT SERPL W P-5'-P-CCNC: 42 U/L (ref 0–50)
AST SERPL W P-5'-P-CCNC: 37 U/L (ref 0–45)
BILIRUB DIRECT SERPL-MCNC: 0.13 MG/DL (ref 0–0.3)
BILIRUB SERPL-MCNC: 0.4 MG/DL
PROT SERPL-MCNC: 7.5 G/DL (ref 6.4–8.3)

## 2025-03-03 PROCEDURE — 80076 HEPATIC FUNCTION PANEL: CPT

## 2025-03-03 PROCEDURE — 36415 COLL VENOUS BLD VENIPUNCTURE: CPT

## 2025-03-17 ENCOUNTER — TRANSFERRED RECORDS (OUTPATIENT)
Dept: HEALTH INFORMATION MANAGEMENT | Facility: CLINIC | Age: 55
End: 2025-03-17
Payer: COMMERCIAL

## 2025-05-27 ENCOUNTER — MYC MEDICAL ADVICE (OUTPATIENT)
Dept: FAMILY MEDICINE | Facility: CLINIC | Age: 55
End: 2025-05-27
Payer: COMMERCIAL

## 2025-05-27 DIAGNOSIS — Z78.0 POSTMENOPAUSAL STATUS: Primary | ICD-10-CM

## 2025-07-01 DIAGNOSIS — R23.2 HOT FLASHES: ICD-10-CM

## 2025-07-01 RX ORDER — VENLAFAXINE HYDROCHLORIDE 37.5 MG/1
37.5 CAPSULE, EXTENDED RELEASE ORAL EVERY EVENING
Qty: 90 CAPSULE | Refills: 1 | Status: SHIPPED | OUTPATIENT
Start: 2025-07-01